# Patient Record
Sex: FEMALE | Race: BLACK OR AFRICAN AMERICAN | NOT HISPANIC OR LATINO | URBAN - METROPOLITAN AREA
[De-identification: names, ages, dates, MRNs, and addresses within clinical notes are randomized per-mention and may not be internally consistent; named-entity substitution may affect disease eponyms.]

---

## 2017-07-09 ENCOUNTER — INPATIENT (INPATIENT)
Facility: HOSPITAL | Age: 52
LOS: 3 days | Discharge: HOME | DRG: 392 | End: 2017-07-13
Attending: INTERNAL MEDICINE | Admitting: INTERNAL MEDICINE
Payer: COMMERCIAL

## 2017-07-09 VITALS
HEART RATE: 90 BPM | DIASTOLIC BLOOD PRESSURE: 74 MMHG | SYSTOLIC BLOOD PRESSURE: 122 MMHG | RESPIRATION RATE: 16 BRPM | TEMPERATURE: 98 F | OXYGEN SATURATION: 100 %

## 2017-07-09 DIAGNOSIS — R10.9 UNSPECIFIED ABDOMINAL PAIN: ICD-10-CM

## 2017-07-09 DIAGNOSIS — R11.10 VOMITING, UNSPECIFIED: ICD-10-CM

## 2017-07-09 DIAGNOSIS — R63.4 ABNORMAL WEIGHT LOSS: ICD-10-CM

## 2017-07-09 DIAGNOSIS — Z90.710 ACQUIRED ABSENCE OF BOTH CERVIX AND UTERUS: Chronic | ICD-10-CM

## 2017-07-09 LAB
ALBUMIN SERPL ELPH-MCNC: 3.8 G/DL — SIGNIFICANT CHANGE UP (ref 3.3–5)
ALP SERPL-CCNC: 33 U/L — LOW (ref 40–120)
ALT FLD-CCNC: 20 U/L RC — SIGNIFICANT CHANGE UP (ref 10–45)
ANION GAP SERPL CALC-SCNC: 15 MMOL/L — SIGNIFICANT CHANGE UP (ref 5–17)
APPEARANCE UR: CLEAR — SIGNIFICANT CHANGE UP
AST SERPL-CCNC: 37 U/L — SIGNIFICANT CHANGE UP (ref 10–40)
BASOPHILS # BLD AUTO: 0.1 K/UL — SIGNIFICANT CHANGE UP (ref 0–0.2)
BASOPHILS NFR BLD AUTO: 1.5 % — SIGNIFICANT CHANGE UP (ref 0–2)
BILIRUB SERPL-MCNC: 0.6 MG/DL — SIGNIFICANT CHANGE UP (ref 0.2–1.2)
BILIRUB UR-MCNC: NEGATIVE — SIGNIFICANT CHANGE UP
BUN SERPL-MCNC: 7 MG/DL — SIGNIFICANT CHANGE UP (ref 7–23)
CALCIUM SERPL-MCNC: 9.4 MG/DL — SIGNIFICANT CHANGE UP (ref 8.4–10.5)
CHLORIDE SERPL-SCNC: 102 MMOL/L — SIGNIFICANT CHANGE UP (ref 96–108)
CO2 SERPL-SCNC: 20 MMOL/L — LOW (ref 22–31)
COLOR SPEC: YELLOW — SIGNIFICANT CHANGE UP
CREAT SERPL-MCNC: 0.7 MG/DL — SIGNIFICANT CHANGE UP (ref 0.5–1.3)
DIFF PNL FLD: NEGATIVE — SIGNIFICANT CHANGE UP
EOSINOPHIL # BLD AUTO: 0 K/UL — SIGNIFICANT CHANGE UP (ref 0–0.5)
EOSINOPHIL NFR BLD AUTO: 0.4 % — SIGNIFICANT CHANGE UP (ref 0–6)
GAS PNL BLDV: SIGNIFICANT CHANGE UP
GLUCOSE SERPL-MCNC: 81 MG/DL — SIGNIFICANT CHANGE UP (ref 70–99)
GLUCOSE UR QL: NEGATIVE — SIGNIFICANT CHANGE UP
HCT VFR BLD CALC: 37.8 % — SIGNIFICANT CHANGE UP (ref 34.5–45)
HGB BLD-MCNC: 12.9 G/DL — SIGNIFICANT CHANGE UP (ref 11.5–15.5)
KETONES UR-MCNC: ABNORMAL
LEUKOCYTE ESTERASE UR-ACNC: NEGATIVE — SIGNIFICANT CHANGE UP
LYMPHOCYTES # BLD AUTO: 1.6 K/UL — SIGNIFICANT CHANGE UP (ref 1–3.3)
LYMPHOCYTES # BLD AUTO: 46.5 % — HIGH (ref 13–44)
MCHC RBC-ENTMCNC: 29 PG — SIGNIFICANT CHANGE UP (ref 27–34)
MCHC RBC-ENTMCNC: 34.1 GM/DL — SIGNIFICANT CHANGE UP (ref 32–36)
MCV RBC AUTO: 85 FL — SIGNIFICANT CHANGE UP (ref 80–100)
MONOCYTES # BLD AUTO: 0.5 K/UL — SIGNIFICANT CHANGE UP (ref 0–0.9)
MONOCYTES NFR BLD AUTO: 13.6 % — SIGNIFICANT CHANGE UP (ref 2–14)
NEUTROPHILS # BLD AUTO: 1.4 K/UL — LOW (ref 1.8–7.4)
NEUTROPHILS NFR BLD AUTO: 38 % — LOW (ref 43–77)
NITRITE UR-MCNC: NEGATIVE — SIGNIFICANT CHANGE UP
PH UR: 6.5 — SIGNIFICANT CHANGE UP (ref 5–8)
PLATELET # BLD AUTO: 253 K/UL — SIGNIFICANT CHANGE UP (ref 150–400)
POTASSIUM SERPL-MCNC: 4.7 MMOL/L — SIGNIFICANT CHANGE UP (ref 3.5–5.3)
POTASSIUM SERPL-SCNC: 4.7 MMOL/L — SIGNIFICANT CHANGE UP (ref 3.5–5.3)
PROT SERPL-MCNC: 7.9 G/DL — SIGNIFICANT CHANGE UP (ref 6–8.3)
PROT UR-MCNC: 30 MG/DL
RBC # BLD: 4.45 M/UL — SIGNIFICANT CHANGE UP (ref 3.8–5.2)
RBC # FLD: 12.4 % — SIGNIFICANT CHANGE UP (ref 10.3–14.5)
SODIUM SERPL-SCNC: 137 MMOL/L — SIGNIFICANT CHANGE UP (ref 135–145)
SP GR SPEC: 1.03 — HIGH (ref 1.01–1.02)
UROBILINOGEN FLD QL: NEGATIVE — SIGNIFICANT CHANGE UP
WBC # BLD: 3.6 K/UL — LOW (ref 3.8–10.5)
WBC # FLD AUTO: 3.6 K/UL — LOW (ref 3.8–10.5)

## 2017-07-09 PROCEDURE — 76705 ECHO EXAM OF ABDOMEN: CPT | Mod: 26

## 2017-07-09 PROCEDURE — 93010 ELECTROCARDIOGRAM REPORT: CPT

## 2017-07-09 PROCEDURE — 99285 EMERGENCY DEPT VISIT HI MDM: CPT | Mod: 25

## 2017-07-09 RX ORDER — ONDANSETRON 8 MG/1
4 TABLET, FILM COATED ORAL EVERY 8 HOURS
Qty: 0 | Refills: 0 | Status: COMPLETED | OUTPATIENT
Start: 2017-07-09 | End: 2017-07-09

## 2017-07-09 RX ORDER — FAMOTIDINE 10 MG/ML
20 INJECTION INTRAVENOUS ONCE
Qty: 0 | Refills: 0 | Status: COMPLETED | OUTPATIENT
Start: 2017-07-09 | End: 2017-07-09

## 2017-07-09 RX ORDER — PANTOPRAZOLE SODIUM 20 MG/1
40 TABLET, DELAYED RELEASE ORAL ONCE
Qty: 0 | Refills: 0 | Status: COMPLETED | OUTPATIENT
Start: 2017-07-09 | End: 2017-07-09

## 2017-07-09 RX ORDER — PANTOPRAZOLE SODIUM 20 MG/1
40 TABLET, DELAYED RELEASE ORAL
Qty: 0 | Refills: 0 | Status: DISCONTINUED | OUTPATIENT
Start: 2017-07-09 | End: 2017-07-13

## 2017-07-09 RX ORDER — SODIUM CHLORIDE 9 MG/ML
1000 INJECTION INTRAMUSCULAR; INTRAVENOUS; SUBCUTANEOUS ONCE
Qty: 0 | Refills: 0 | Status: COMPLETED | OUTPATIENT
Start: 2017-07-09 | End: 2017-07-09

## 2017-07-09 RX ORDER — SODIUM CHLORIDE 9 MG/ML
1000 INJECTION INTRAMUSCULAR; INTRAVENOUS; SUBCUTANEOUS
Qty: 0 | Refills: 0 | Status: DISCONTINUED | OUTPATIENT
Start: 2017-07-09 | End: 2017-07-13

## 2017-07-09 RX ORDER — METOCLOPRAMIDE HCL 10 MG
10 TABLET ORAL ONCE
Qty: 0 | Refills: 0 | Status: COMPLETED | OUTPATIENT
Start: 2017-07-09 | End: 2017-07-09

## 2017-07-09 RX ORDER — ENOXAPARIN SODIUM 100 MG/ML
40 INJECTION SUBCUTANEOUS DAILY
Qty: 0 | Refills: 0 | Status: DISCONTINUED | OUTPATIENT
Start: 2017-07-09 | End: 2017-07-13

## 2017-07-09 RX ORDER — SUCRALFATE 1 G
1 TABLET ORAL
Qty: 0 | Refills: 0 | Status: DISCONTINUED | OUTPATIENT
Start: 2017-07-09 | End: 2017-07-13

## 2017-07-09 RX ADMIN — SODIUM CHLORIDE 2000 MILLILITER(S): 9 INJECTION INTRAMUSCULAR; INTRAVENOUS; SUBCUTANEOUS at 17:35

## 2017-07-09 RX ADMIN — Medication 10 MILLIGRAM(S): at 17:35

## 2017-07-09 RX ADMIN — FAMOTIDINE 20 MILLIGRAM(S): 10 INJECTION INTRAVENOUS at 17:35

## 2017-07-09 RX ADMIN — SODIUM CHLORIDE 100 MILLILITER(S): 9 INJECTION INTRAMUSCULAR; INTRAVENOUS; SUBCUTANEOUS at 21:32

## 2017-07-09 RX ADMIN — ENOXAPARIN SODIUM 40 MILLIGRAM(S): 100 INJECTION SUBCUTANEOUS at 23:17

## 2017-07-09 RX ADMIN — PANTOPRAZOLE SODIUM 40 MILLIGRAM(S): 20 TABLET, DELAYED RELEASE ORAL at 19:17

## 2017-07-09 RX ADMIN — ONDANSETRON 4 MILLIGRAM(S): 8 TABLET, FILM COATED ORAL at 23:17

## 2017-07-09 NOTE — H&P ADULT - NEGATIVE CARDIOVASCULAR SYMPTOMS
no chest pain/no paroxysmal nocturnal dyspnea/no palpitations/no orthopnea/no dyspnea on exertion/no peripheral edema/no claudication

## 2017-07-09 NOTE — ED PROVIDER NOTE - PROGRESS NOTE DETAILS
Patient was evaluated by me, found in no acute distress, calm, speaking in complete sentences, with recent imaging studies remarkable for gastritis, but with history remarkable for poor tolerance to oral intake, and physical exam remarkable for epigastric tenderness to palpation. Labs ordered and medication ordered for treatment. Will monitor clinical progression. I agree with resident assessment and plan. Dr. Sherwin Davenport

## 2017-07-09 NOTE — H&P ADULT - RS GEN PE MLT RESP DETAILS PC
airway patent/clear to auscultation bilaterally/no rhonchi/good air movement/breath sounds equal/no rales/no intercostal retractions/respirations non-labored/no chest wall tenderness/normal

## 2017-07-09 NOTE — ED PROVIDER NOTE - MEDICAL DECISION MAKING DETAILS
52 yo F with intractable vomiting. Had recent CT with unremarkable finding. EGD showed acute gastritis. No sx improvement despite sucralfate, zofran, ranitidine. Will try reglan, CBC, BMP for electrolytes check. 50 yo F with intractable vomiting. Had recent CT with unremarkable finding. EGD showed acute gastritis. No sx improvement despite sucralfate, zofran, ranitidine. Will try reglan, CBC, BMP for electrolytes check.    Attending: Patient was evaluated by me, found in no acute distress, calm, speaking in complete sentences, with recent imaging studies remarkable for gastritis, but with history remarkable for poor tolerance to oral intake, and physical exam remarkable for epigastric tenderness to palpation. Labs ordered and medication ordered for treatment. Will monitor clinical progression. I agree with resident assessment and plan. Dr. Sherwin Davenport

## 2017-07-09 NOTE — H&P ADULT - HISTORY OF PRESENT ILLNESS
51 f with hx of gastritis pw 6 months hx  N/V. Pt has been vomiting 1-3 times most days for the past 6 months. She has seen GI in the past. Initially started on Nexium. Pt had EGD and bx done which showed acute gastritis. Pt was switched to sucralfate, Zofran, ranitidine, Pt also had CT a/p on 6/26/2017 which only shows liver cysts and R ovarian cyst. Pt reports that she lost about 40 lbs since then. Pt had abd hernia repair , hysterectomy and adhesiolysis in 2014. Pt has not been able to

## 2017-07-09 NOTE — H&P ADULT - GASTROINTESTINAL DETAILS
no distention/no bruit/bowel sounds normal/soft/no rebound tenderness/normal/no rigidity/no masses palpable/no guarding/nontender/no organomegaly

## 2017-07-09 NOTE — ED PROVIDER NOTE - ATTENDING CONTRIBUTION TO CARE
Patient was evaluated by me, found in no acute distress, calm, speaking in complete sentences, with recent imaging studies remarkable for gastritis, but with history remarkable for poor tolerance to oral intake, and physical exam remarkable for epigastric tenderness to palpation. Labs ordered and medication ordered for treatment. Patient remained symptomatic despite treatment at ED. Patient was admitted to Medicine due to intractable vomiting. I agree with resident assessment and plan. Dr. Sherwin Davenport

## 2017-07-09 NOTE — ED ADULT NURSE REASSESSMENT NOTE - NS ED NURSE REASSESS COMMENT FT1
Received report from PALOMA Brower. Pt A&Ox3, resting, VS stable. Safety checked. No c/o pain or discomfort at this time. Comfort care provided. Pt encouraged to call for assist when needed. pending dispo

## 2017-07-09 NOTE — ED PROVIDER NOTE - OBJECTIVE STATEMENT
51 f with 6 months hx N then n?V. seen GI. started on Nexium. switched to sucralfate, Zofran, ranitidine,  lost about 40 lbs since then. Pt had abd hernia repair , hysterectomy in 2014.    GI: Dr. Mcpherson 51 f with hx of gastritis pw 6 months hx  N/V. Pt has been vomiting 1-3 times most days for the past 6 months. She has seen GI in the past. Initially started on Nexium. Pt had EGD and bx done which showed acute gastritis. Pt was switched to sucralfate, Zofran, ranitidine, Pt also had CT a/p on 6/26/2017 which only shows liver cysts and R ovarian cyst. Pt reports that she lost about 40 lbs since then. Pt had abd hernia repair , hysterectomy and adhesiolysis in 2014. Pt has not been able to     GI: Dr. Mcpherson

## 2017-07-10 DIAGNOSIS — R11.2 NAUSEA WITH VOMITING, UNSPECIFIED: ICD-10-CM

## 2017-07-10 DIAGNOSIS — K29.50 UNSPECIFIED CHRONIC GASTRITIS WITHOUT BLEEDING: ICD-10-CM

## 2017-07-10 PROCEDURE — 71250 CT THORAX DX C-: CPT | Mod: 26

## 2017-07-10 RX ORDER — CHLORPROMAZINE HCL 10 MG
25 TABLET ORAL ONCE
Qty: 0 | Refills: 0 | Status: COMPLETED | OUTPATIENT
Start: 2017-07-10 | End: 2017-07-10

## 2017-07-10 RX ORDER — METOCLOPRAMIDE HCL 10 MG
5 TABLET ORAL
Qty: 0 | Refills: 0 | Status: COMPLETED | OUTPATIENT
Start: 2017-07-10 | End: 2017-07-10

## 2017-07-10 RX ORDER — ONDANSETRON 8 MG/1
4 TABLET, FILM COATED ORAL EVERY 6 HOURS
Qty: 0 | Refills: 0 | Status: COMPLETED | OUTPATIENT
Start: 2017-07-10 | End: 2017-07-10

## 2017-07-10 RX ADMIN — PANTOPRAZOLE SODIUM 40 MILLIGRAM(S): 20 TABLET, DELAYED RELEASE ORAL at 17:09

## 2017-07-10 RX ADMIN — Medication 25 MILLIGRAM(S): at 17:09

## 2017-07-10 RX ADMIN — ONDANSETRON 4 MILLIGRAM(S): 8 TABLET, FILM COATED ORAL at 08:04

## 2017-07-10 RX ADMIN — Medication 1 GRAM(S): at 11:42

## 2017-07-10 RX ADMIN — ENOXAPARIN SODIUM 40 MILLIGRAM(S): 100 INJECTION SUBCUTANEOUS at 11:41

## 2017-07-10 RX ADMIN — Medication 1 GRAM(S): at 17:09

## 2017-07-10 RX ADMIN — Medication 5 MILLIGRAM(S): at 11:42

## 2017-07-10 RX ADMIN — Medication 1 GRAM(S): at 23:03

## 2017-07-10 RX ADMIN — SODIUM CHLORIDE 100 MILLILITER(S): 9 INJECTION INTRAMUSCULAR; INTRAVENOUS; SUBCUTANEOUS at 23:03

## 2017-07-10 NOTE — CONSULT NOTE ADULT - PROBLEM SELECTOR RECOMMENDATION 2
- In the setting of normal imaging (US ABD and CT abd done as outpatient)  - In the setting of normal WBC/lfts (lipase pending)  - Continue Reglan 5 mg IV push x 1  - Will discuss case w/ attending - In the setting of normal imaging (US ABD and CT abd done as outpatient)  - In the setting of normal WBC/lfts (lipase pending)  - Continue Reglan 5 mg IV push x 1  - Plan for  upper gastrointestinal endoscopy in AM -- NPO p MN

## 2017-07-11 DIAGNOSIS — D72.819 DECREASED WHITE BLOOD CELL COUNT, UNSPECIFIED: ICD-10-CM

## 2017-07-11 DIAGNOSIS — R06.02 SHORTNESS OF BREATH: ICD-10-CM

## 2017-07-11 DIAGNOSIS — R91.1 SOLITARY PULMONARY NODULE: ICD-10-CM

## 2017-07-11 LAB
ALBUMIN SERPL ELPH-MCNC: 3.6 G/DL — SIGNIFICANT CHANGE UP (ref 3.3–5)
ALP SERPL-CCNC: 33 U/L — LOW (ref 40–120)
ALT FLD-CCNC: 20 U/L RC — SIGNIFICANT CHANGE UP (ref 10–45)
ANION GAP SERPL CALC-SCNC: 13 MMOL/L — SIGNIFICANT CHANGE UP (ref 5–17)
APTT BLD: 28.7 SEC — SIGNIFICANT CHANGE UP (ref 27.5–37.4)
AST SERPL-CCNC: 29 U/L — SIGNIFICANT CHANGE UP (ref 10–40)
BILIRUB SERPL-MCNC: 0.5 MG/DL — SIGNIFICANT CHANGE UP (ref 0.2–1.2)
BUN SERPL-MCNC: 6 MG/DL — LOW (ref 7–23)
CALCIUM SERPL-MCNC: 8.9 MG/DL — SIGNIFICANT CHANGE UP (ref 8.4–10.5)
CHLORIDE SERPL-SCNC: 105 MMOL/L — SIGNIFICANT CHANGE UP (ref 96–108)
CO2 SERPL-SCNC: 19 MMOL/L — LOW (ref 22–31)
CREAT SERPL-MCNC: 0.72 MG/DL — SIGNIFICANT CHANGE UP (ref 0.5–1.3)
GLUCOSE SERPL-MCNC: 73 MG/DL — SIGNIFICANT CHANGE UP (ref 70–99)
HCT VFR BLD CALC: 41 % — SIGNIFICANT CHANGE UP (ref 34.5–45)
HGB BLD-MCNC: 13.2 G/DL — SIGNIFICANT CHANGE UP (ref 11.5–15.5)
INR BLD: 1.1 RATIO — SIGNIFICANT CHANGE UP (ref 0.88–1.16)
MCHC RBC-ENTMCNC: 27.5 PG — SIGNIFICANT CHANGE UP (ref 27–34)
MCHC RBC-ENTMCNC: 32.1 GM/DL — SIGNIFICANT CHANGE UP (ref 32–36)
MCV RBC AUTO: 85.7 FL — SIGNIFICANT CHANGE UP (ref 80–100)
PLATELET # BLD AUTO: 238 K/UL — SIGNIFICANT CHANGE UP (ref 150–400)
POTASSIUM SERPL-MCNC: 4 MMOL/L — SIGNIFICANT CHANGE UP (ref 3.5–5.3)
POTASSIUM SERPL-SCNC: 4 MMOL/L — SIGNIFICANT CHANGE UP (ref 3.5–5.3)
PROT SERPL-MCNC: 7.5 G/DL — SIGNIFICANT CHANGE UP (ref 6–8.3)
PROTHROM AB SERPL-ACNC: 12 SEC — SIGNIFICANT CHANGE UP (ref 9.8–12.7)
RBC # BLD: 4.79 M/UL — SIGNIFICANT CHANGE UP (ref 3.8–5.2)
RBC # FLD: 12.5 % — SIGNIFICANT CHANGE UP (ref 10.3–14.5)
SODIUM SERPL-SCNC: 137 MMOL/L — SIGNIFICANT CHANGE UP (ref 135–145)
WBC # BLD: 3 K/UL — LOW (ref 3.8–10.5)
WBC # FLD AUTO: 3 K/UL — LOW (ref 3.8–10.5)

## 2017-07-11 PROCEDURE — 88342 IMHCHEM/IMCYTCHM 1ST ANTB: CPT | Mod: 26

## 2017-07-11 PROCEDURE — 88305 TISSUE EXAM BY PATHOLOGIST: CPT | Mod: 26

## 2017-07-11 PROCEDURE — 88312 SPECIAL STAINS GROUP 1: CPT | Mod: 26

## 2017-07-11 RX ORDER — CHLORPROMAZINE HCL 10 MG
25 TABLET ORAL ONCE
Qty: 0 | Refills: 0 | Status: COMPLETED | OUTPATIENT
Start: 2017-07-11 | End: 2017-07-11

## 2017-07-11 RX ADMIN — PANTOPRAZOLE SODIUM 40 MILLIGRAM(S): 20 TABLET, DELAYED RELEASE ORAL at 16:52

## 2017-07-11 RX ADMIN — Medication 25 MILLIGRAM(S): at 17:58

## 2017-07-11 RX ADMIN — Medication 1 GRAM(S): at 16:52

## 2017-07-11 RX ADMIN — SODIUM CHLORIDE 100 MILLILITER(S): 9 INJECTION INTRAMUSCULAR; INTRAVENOUS; SUBCUTANEOUS at 17:58

## 2017-07-11 RX ADMIN — ENOXAPARIN SODIUM 40 MILLIGRAM(S): 100 INJECTION SUBCUTANEOUS at 16:52

## 2017-07-11 RX ADMIN — Medication 1 GRAM(S): at 22:59

## 2017-07-11 NOTE — CONSULT NOTE ADULT - PROBLEM SELECTOR RECOMMENDATION 9
- 40 lb weight loss noted within the last year
Pt was incidentally found to have 3 mm RML nodule and has no pulmonary hx, never smoker and never had any malignancies, hence this nodule can be followed inone year as an outpatient.

## 2017-07-11 NOTE — CONSULT NOTE ADULT - PROBLEM SELECTOR RECOMMENDATION 2
Pt has this very vague history of SOB, which is not associated with any activity and can occur at any time: She denies wheezing and any other pulmonary history. There is no strong family history of asthma too. This symptom does not fit into any of the pulmonary pathology: Her VTE probability is pretty low: Her calf muscles are not swollen and tender. Will do spirometry in AM .

## 2017-07-11 NOTE — CONSULT NOTE ADULT - PROBLEM SELECTOR RECOMMENDATION 3
- Continue PPI 40 mg po bid  - Continue carafate 1 g po 4x /day
s/p endoscopy:  Normal esophagus.                       - Gastritis. Biopsied.                       - Duodenitis. Biopsied.  For gastric emptying study!!

## 2017-07-11 NOTE — CONSULT NOTE ADULT - PROBLEM SELECTOR PROBLEM 3
Intractable vomiting with nausea, unspecified vomiting type
Chronic gastritis without bleeding, unspecified gastritis type

## 2017-07-12 RX ORDER — CHLORPROMAZINE HCL 10 MG
25 TABLET ORAL ONCE
Qty: 0 | Refills: 0 | Status: COMPLETED | OUTPATIENT
Start: 2017-07-12 | End: 2017-07-12

## 2017-07-12 RX ORDER — CHLORPROMAZINE HCL 10 MG
25 TABLET ORAL ONCE
Qty: 0 | Refills: 0 | Status: DISCONTINUED | OUTPATIENT
Start: 2017-07-12 | End: 2017-07-12

## 2017-07-12 RX ADMIN — PANTOPRAZOLE SODIUM 40 MILLIGRAM(S): 20 TABLET, DELAYED RELEASE ORAL at 16:56

## 2017-07-12 RX ADMIN — Medication 1 GRAM(S): at 05:32

## 2017-07-12 RX ADMIN — Medication 25 MILLIGRAM(S): at 00:39

## 2017-07-12 RX ADMIN — PANTOPRAZOLE SODIUM 40 MILLIGRAM(S): 20 TABLET, DELAYED RELEASE ORAL at 05:32

## 2017-07-12 RX ADMIN — Medication 1 GRAM(S): at 11:40

## 2017-07-12 RX ADMIN — Medication 1 GRAM(S): at 16:56

## 2017-07-12 RX ADMIN — ENOXAPARIN SODIUM 40 MILLIGRAM(S): 100 INJECTION SUBCUTANEOUS at 16:56

## 2017-07-12 RX ADMIN — SODIUM CHLORIDE 100 MILLILITER(S): 9 INJECTION INTRAMUSCULAR; INTRAVENOUS; SUBCUTANEOUS at 16:56

## 2017-07-12 RX ADMIN — SODIUM CHLORIDE 100 MILLILITER(S): 9 INJECTION INTRAMUSCULAR; INTRAVENOUS; SUBCUTANEOUS at 05:32

## 2017-07-12 NOTE — CONSULT NOTE ADULT - SUBJECTIVE AND OBJECTIVE BOX
Chief Complaint:  Patient is a 51y old  Female who presents with a chief complaint of vomiting and abdominal pain (2017 19:45)    HPI:  51 f with hx of gastritis pw 6 months hx  N/V. Pt has been vomiting 1-3 times most days for the past 6 months. She has seen GI in the past. Initially started on Nexium. Pt had EGD and bx done which showed acute gastritis. Pt was switched to sucralfate, Zofran, ranitidine, Pt also had CT a/p on 2017 which only shows liver cysts and R ovarian cyst. Pt reports that she lost about 40 lbs since then. Pt had abd hernia repair , hysterectomy and adhesiolysis in . Pt has not been able to (2017 19:45)    Patient seen and examined at the bedside for c/o intermittent nausea (and nbnb emesis 1-3x per day) that has been persistent for the past 6 months. Nausea worse after meals, and associated with mild sensation of food bolus or liquid getting "stuck in the chest" as well as significant weight loss (? 40lbs approx) within the past year. Patient does endorse a mild decrease in her symptoms for a couple of weeks within this time frame but states that her symptoms have gotten much more frequent. She is unable to tolerate po at present.   Of note she did have an egd in late May of 2017 by Dr. Israel Mcpherson which revealed gastritis. She has been managed by her primary GI w/ protonix, carafate and zofran with minimal relief.      PAST MEDICAL & SURGICAL HISTORY:  Fibroid, uterine  H/O abdominal hysterectomy      Previous Diagnostic Testing:    EGD: 17 Dr. David Mcpherson --- gastritis     Colonoscopy: About 5 years ago -- negative     Home Medications:    sodium chloride 0.9%. 1000 milliLiter(s) IV Continuous <Continuous>  sucralfate 1 Gram(s) Oral four times a day  pantoprazole    Tablet 40 milliGRAM(s) Oral two times a day before meals  enoxaparin Injectable 40 milliGRAM(s) SubCutaneous daily  metoclopramide Injectable 5 milliGRAM(s) IV Push <User Schedule>        FAMILY HISTORY:  No pertinent family history in first degree relatives      Social History: Marital Status: Unknown Lives With: Alone Substance Abuse: None Smoker: None     Allergies    No Known Allergies    Intolerances: ----    Review of Systems:    General:  +wt loss, No fevers, chills, night sweats, +fatigue,   Eyes:  Good vision, no reported pain  ENT:  No sore throat, pain, runny nose, +dysphagia  CV:  No pain, palpitations hypo/hypertension  Resp:  No dyspnea, cough, tachypnea, wheezing  :  No pain, bleeding, incontinence, nocturia  Muscle:  No pain, + weakness  Neuro:  No weakness, tingling, memory problems  Psych:  No fatigue, insomnia, mood problems, depression  Endocrine:  No polyuria, polydypsia, cold/heat intolerance  Heme:  No petechiae, ecchymosis, easy bruisability  Skin:  No rash, tattoos, scars, edema    Physical Exam:    Vital Signs:  Vital Signs Last 24 Hrs  T(C): 36.7 (10 Jul 2017 09:27), Max: 37.2 (2017 21:12)  T(F): 98.1 (10 Jul 2017 09:), Max: 98.9 (2017 21:12)  HR: 73 (10 Jul 2017 09:) (67 - 90)  BP: 116/72 (10 Jul 2017 09:) (116/71 - 156/89)  BP(mean): --  RR: 17 (10 Jul 2017 09:) (16 - 18)  SpO2: 97% (10 Jul 2017 09:) (97% - 100%)  Daily Height in cm: 172.72 (10 Jul 2017 07:53)    Daily     General:  Appears stated age, well-groomed, well-nourished, no distress  HEENT:  NC/AT,  conjunctivae clear and pink, no thyromegaly, nodules, adenopathy, no JVD  Chest:  Full & symmetric excursion, no increased effort, breath sounds clear  Cardiovascular:  Regular rhythm, S1, S2, no murmur/rub/S3/S4, no abdominal bruit, no edema  Abdomen:  Soft, non-tender, non-distended, normoactive bowel sounds,  no masses ,no hepatosplenomeagaly, no signs of chronic liver disease, +hiccups  Extremities:  no cyanosis, clubbing or edema  Skin:  No rash/erythema/ecchymoses/petechiae/wounds/abscess/warm/dry  Neuro/Psych:  Alert, oriented, no asterixis, no tremor, no encephalopathy      Imaging:     < from: US Echo Abdomen Limited (ED) (17 @ 21:21) >  INTERPRETATION:  Grayscale imaging of the right upper quadrant was   performed.    The gallbladder was visualized and scanned in longitudinal and transverse   planes.  The anterior gallbladder wall measured  .28cm.  The common bile duct measured .21cm.  Gallstones not present.    Sonographic Nuno's sign not present.    IMPRESSION:Normal Gallbladder    < end of copied text >      Laboratory:      137  |  102  |  7   ----------------------------<  81  4.7   |  20<L>  |  0.70    Ca    9.4      2017 17:27    TPro  7.9  /  Alb  3.8  /  TBili  0.6  /  DBili  x   /  AST  37  /  ALT  20  /  AlkPhos  33<L>                            12.9   3.6   )-----------( 253      ( 2017 17:27 )             37.8         LIVER FUNCTIONS - ( 2017 17:27 )  Alb: 3.8 g/dL / Pro: 7.9 g/dL / ALK PHOS: 33 U/L / ALT: 20 U/L RC / AST: 37 U/L / GGT: x             Urinalysis Basic - ( 2017 19:43 )    Color: Yellow / Appearance: Clear / S.028 / pH: x  Gluc: x / Ketone: Large  / Bili: Negative / Urobili: Negative   Blood: x / Protein: 30 mg/dL / Nitrite: Negative   Leuk Esterase: Negative / RBC: 0-2 /HPF / WBC 2-5 /HPF   Sq Epi: x / Non Sq Epi: x / Bacteria: Few /HPF
I have reviewed the history and discussed with the patient. In addition, pt says since may she feels SOB of and on and she can feel it any time: She denies any history of malignancy and no history of any pulmonary issues. She can feel SOB off and on at any time, not particularly related to any activity and never woke up from sleep with SOB. She never has PE/ DVT and doesn't use inhalers!!      Patient is a 51y old  Female who presents with a chief complaint of vomiting and abdominal pain (2017 19:45)      HPI:  51 f with hx of gastritis pw 6 months hx  N/V. Pt has been vomiting 1-3 times most days for the past 6 months. She has seen GI in the past. Initially started on Nexium. Pt had EGD and bx done which showed acute gastritis. Pt was switched to sucralfate, Zofran, ranitidine, Pt also had CT a/p on 2017 which only shows liver cysts and R ovarian cyst. Pt reports that she lost about 40 lbs since then. Pt had abd hernia repair , hysterectomy and adhesiolysis in . Pt has not been able to (2017 19:45)      ?FOLLOWING PRESENT  [x ] Hx of PE/DVT, [x ] Hx COPD, [x ] Hx of Asthma, [x ] Hx of Hospitalization, [ x]  Hx of BiPAP/CPAP use, [x ] Hx of DIETER    Allergies    No Known Allergies    Intolerances        PAST MEDICAL & SURGICAL HISTORY:  Fibroid, uterine  H/O abdominal hysterectomy      FAMILY HISTORY:  No pertinent family history in first degree relatives      Social History: [x  ] TOBACCO                  [x  ] ETOH                                 [ x ] IVDA/DRUGS    REVIEW OF SYSTEMS      General:	weight loss 40 lbs    Skin/Breast:x  	  Ophthalmologic:x  	  ENMT:	    Respiratory and Thorax: sob off and on  	  Cardiovascular:	x    Gastrointestinal:	 nausea and vomiting     Genitourinary:	x    Musculoskeletal:	x    Neurological:x	    Psychiatric:	x    Hematology/Lymphatics:x	    Endocrine:	x    Allergic/Immunologic:	x    MEDICATIONS  (STANDING):  sodium chloride 0.9%. 1000 milliLiter(s) (100 mL/Hr) IV Continuous <Continuous>  sucralfate 1 Gram(s) Oral four times a day  pantoprazole    Tablet 40 milliGRAM(s) Oral two times a day before meals  enoxaparin Injectable 40 milliGRAM(s) SubCutaneous daily  chlorproMAZINE    Tablet 25 milliGRAM(s) Oral once    MEDICATIONS  (PRN):       Vital Signs Last 24 Hrs  T(C): 36.9 (2017 15:59), Max: 37.1 (2017 05:39)  T(F): 98.5 (2017 15:59), Max: 98.8 (2017 05:39)  HR: 68 (2017 15:59) (68 - 87)  BP: 136/78 (2017 15:59) (127/87 - 144/75)  BP(mean): --  RR: 18 (2017 15:59) (16 - 18)  SpO2: 100% (2017 13:10) (95% - 100%)        I&O's Summary    10 Jul 2017 07:  -  2017 07:00  --------------------------------------------------------  IN: 2290 mL / OUT: 1 mL / NET: 2289 mL    2017 07:01  -  2017 17:24  --------------------------------------------------------  IN: 400 mL / OUT: 0 mL / NET: 400 mL        Physical Exam:   GENERAL: NAD, well-groomed, well-developed  HEENT: SYLVIA/   Atraumatic, Normocephalic  ENMT: No tonsillar erythema, exudates, or enlargement; Moist mucous membranes, Good dentition, No lesions  NECK: Supple, No JVD, Normal thyroid  CHEST/LUNG: Clear to percussion bilaterally; No rales, rhonchi, wheezing, or rubs  CVS: Regular rate and rhythm; No murmurs, rubs, or gallops  GI: : Soft, Nontender, Nondistended; Bowel sounds present  NERVOUS SYSTEM:  Alert & Oriented X3  EXTREMITIES:  2+ Peripheral Pulses, No clubbing, cyanosis, or edema  LYMPH: No lymphadenopathy noted  SKIN: No rashes or lesions  ENDOCRINOLOGY: No Thyromegaly  PSYCH: Appropriate    Labs:                        13.2   3.0   )-----------( 238      ( 2017 06:53 )             41.0                         12.9   3.6   )-----------( 253      ( 2017 17:27 )             37.8         137  |  105  |  6<L>  ----------------------------<  73  4.0   |  19<L>  |  0.72      137  |  102  |  7   ----------------------------<  81  4.7   |  20<L>  |  0.70    Ca    8.9      2017 06:50  Ca    9.4      2017 17:27    TPro  7.5  /  Alb  3.6  /  TBili  0.5  /  DBili  x   /  AST  29  /  ALT  20  /  AlkPhos  33<L>    TPro  7.9  /  Alb  3.8  /  TBili  0.6  /  DBili  x   /  AST  37  /  ALT  20  /  AlkPhos  33<L>      CAPILLARY BLOOD GLUCOSE        LIVER FUNCTIONS - ( 2017 06:50 )  Alb: 3.6 g/dL / Pro: 7.5 g/dL / ALK PHOS: 33 U/L / ALT: 20 U/L RC / AST: 29 U/L / GGT: x           PT/INR - ( 2017 06:53 )   PT: 12.0 sec;   INR: 1.10 ratio         PTT - ( 2017 06:53 )  PTT:28.7 sec  Urinalysis Basic - ( 2017 19:43 )    Color: Yellow / Appearance: Clear / S.028 / pH: x  Gluc: x / Ketone: Large  / Bili: Negative / Urobili: Negative   Blood: x / Protein: 30 mg/dL / Nitrite: Negative   Leuk Esterase: Negative / RBC: 0-2 /HPF / WBC 2-5 /HPF   Sq Epi: x / Non Sq Epi: x / Bacteria: Few /HPF      D DImer    Cultures:   Studies  Chest X-RAY  CT SCAN Chest < from: CT Chest No Cont (07.10.17 @ 16:35) >    IMPRESSION:   0.3 cm solid nodule in the right middle lobe is indeterminate based on   this exam. According to the current guidelines, no further follow-up is   recommended in the absence of known history of malignancy and/or risk   factors, otherwise, follow-up CT scan is recommended in one year. If the   patient has history of malignancy, follow-up examination as indicated by   the guidelines for that malignancy is recommended.      DAVION FONTENOT M.D., RADIOLOGY RESIDENT  This document has been electronically signed.  LAKE MITCHELL M.D., ATTENDING RADIOLOGIST  This document has been electronically signed. Jul 10 2017  4:58PM    < end of copied text >    CT Abdomen  Venous Dopplers: LE:   Others
Patient is a 51y old  Female who presents with a chief complaint of vomiting and abdominal pain (09 Jul 2017 19:45), here with abdominal pain, weight loss of 40 pounds in last few months, has been on motrin for headaches and had CT abd/pelvis done as outpt, was supposed to see gyn on monday.  Has no h/o any bleeding, back pain, any frequent or serious infections or any frequent use of abx.  No h/o blood transfusion, hepatitis or jaundice.  Last colonoscopy was 4 years ago, last mammo was last year.      PAST MEDICAL & SURGICAL HISTORY:  Fibroid, uterine  H/O abdominal hysterectomy    Meds:  sodium chloride 0.9%. 1000 milliLiter(s) IV Continuous <Continuous>  sucralfate 1 Gram(s) Oral four times a day  pantoprazole    Tablet 40 milliGRAM(s) Oral two times a day before meals  enoxaparin Injectable 40 milliGRAM(s) SubCutaneous daily      No Known Allergies    SHx: Quit alcohol in december, used to drink socially, smoked a couple of times when she was 11.    FAMILY HISTORY:  No pertinent family history in first degree relatives      Vital Signs Last 24 Hrs  T(C): 37.1 (12 Jul 2017 05:12), Max: 37.1 (11 Jul 2017 20:50)  T(F): 98.8 (12 Jul 2017 05:12), Max: 98.8 (12 Jul 2017 05:12)  HR: 85 (12 Jul 2017 05:12) (68 - 85)  BP: 150/87 (12 Jul 2017 05:12) (127/87 - 158/80)  BP(mean): --  RR: 16 (12 Jul 2017 05:12) (16 - 18)  SpO2: 100% (12 Jul 2017 05:12) (95% - 100%)                          13.2   3.0   )-----------( 238      ( 11 Jul 2017 06:53 )             41.0       07-11    137  |  105  |  6<L>  ----------------------------<  73  4.0   |  19<L>  |  0.72    Ca    8.9      11 Jul 2017 06:50    TPro  7.5  /  Alb  3.6  /  TBili  0.5  /  DBili  x   /  AST  29  /  ALT  20  /  AlkPhos  33<L>  07-11      PT/INR - ( 11 Jul 2017 06:53 )   PT: 12.0 sec;   INR: 1.10 ratio         PTT - ( 11 Jul 2017 06:53 )  PTT:28.7 sec      CT abd/pelvis from 6.23.17 (done at [facility]): scattered hepatic cysts upto 1.1 cm, s/p hysterectomy, several right ovarian cysts upto 1.6 cm    CT chest: IMPRESSION:   0.3 cm solid nodule in the right middle lobe is indeterminate based on   this exam. According to the current guidelines, no further follow-up is   recommended in the absence of known history of malignancy and/or risk   factors, otherwise, follow-up CT scan is recommended in one year. If the   patient has history of malignancy, follow-up examination as indicated by   the guidelines for that malignancy is recommended.    Limited abd u/s: IMPRESSION: Normal Gallbladder    EGD: Findings:       The examined esophagus was normal.       Mild inflammation was found in the gastric antrum. Biopsies were taken with a cold forceps        for histology. Estimated blood loss: none.       Localized mild inflammation was found in the duodenal bulb. Biopsies were taken with a cold        forceps for histology. Estimated blood loss: none.                                                                                                        Impression:          - Normal esophagus.                       - Gastritis. Biopsied.                       - Duodenitis. Biopsied.  Recommendation:      - [facility].                       - Resume previous diet.                       - Do a gastric emptying study.              - DC reglan

## 2017-07-12 NOTE — CONSULT NOTE ADULT - ASSESSMENT
51 F w/ pmhx gastritis admitted for intermittent nausea and vomiting x 6 months associated with 40 lb weight loss and intermittent dysphagia to solids/liquids
51 f with hx of gastritis pw 6 months hx  N/V. Pt has been vomiting 1-3 times most days for the past 6 months. She has seen GI in the past.  Pt reports that she lost about 40 lbs since then. On workup in [facility] she had ct chest which showed 3 mm RML nodule and since pt is also complaining of SOB off and on, pulmonary called to evaluate
51F with weight loss, mild leucopenia and above findings on scans and other work up, will recommend:  - continue Rx as per medicine and GI.  - f/u on Bx results  - obtain RF, KELTON, B12, folate, hepatitis profile and HIV 1/2  - , CEA, CA 19-9 and CA 27.29  - monitor CBC  - CTS evaluation  - Gyn/onc evaluation  - will f/u

## 2017-07-13 VITALS
TEMPERATURE: 98 F | HEART RATE: 82 BPM | RESPIRATION RATE: 16 BRPM | DIASTOLIC BLOOD PRESSURE: 84 MMHG | SYSTOLIC BLOOD PRESSURE: 123 MMHG | OXYGEN SATURATION: 97 %

## 2017-07-13 DIAGNOSIS — K31.84 GASTROPARESIS: ICD-10-CM

## 2017-07-13 DIAGNOSIS — K29.90 GASTRODUODENITIS, UNSPECIFIED, WITHOUT BLEEDING: ICD-10-CM

## 2017-07-13 LAB
ANION GAP SERPL CALC-SCNC: 13 MMOL/L — SIGNIFICANT CHANGE UP (ref 5–17)
BUN SERPL-MCNC: 5 MG/DL — LOW (ref 7–23)
CALCIUM SERPL-MCNC: 8.8 MG/DL — SIGNIFICANT CHANGE UP (ref 8.4–10.5)
CANCER AG125 SERPL-ACNC: 12 U/ML — SIGNIFICANT CHANGE UP
CANCER AG19-9 SERPL-ACNC: 11.4 U/ML — SIGNIFICANT CHANGE UP
CANCER AG27-29 SERPL-ACNC: 15.2 U/ML — SIGNIFICANT CHANGE UP (ref 0–37.7)
CEA SERPL-MCNC: 0.5 NG/ML — SIGNIFICANT CHANGE UP (ref 0–3.8)
CHLORIDE SERPL-SCNC: 104 MMOL/L — SIGNIFICANT CHANGE UP (ref 96–108)
CO2 SERPL-SCNC: 19 MMOL/L — LOW (ref 22–31)
CREAT SERPL-MCNC: 0.64 MG/DL — SIGNIFICANT CHANGE UP (ref 0.5–1.3)
FOLATE SERPL-MCNC: >20 NG/ML — SIGNIFICANT CHANGE UP (ref 4.8–24.2)
GLUCOSE SERPL-MCNC: 83 MG/DL — SIGNIFICANT CHANGE UP (ref 70–99)
HAV IGM SER-ACNC: SIGNIFICANT CHANGE UP
HBV CORE IGM SER-ACNC: SIGNIFICANT CHANGE UP
HBV SURFACE AG SER-ACNC: SIGNIFICANT CHANGE UP
HCT VFR BLD CALC: 37.4 % — SIGNIFICANT CHANGE UP (ref 34.5–45)
HCV AB S/CO SERPL IA: 0.09 S/CO — SIGNIFICANT CHANGE UP
HCV AB SERPL-IMP: SIGNIFICANT CHANGE UP
HGB BLD-MCNC: 12.7 G/DL — SIGNIFICANT CHANGE UP (ref 11.5–15.5)
HIV 1+2 AB+HIV1 P24 AG SERPL QL IA: SIGNIFICANT CHANGE UP
MCHC RBC-ENTMCNC: 28.9 PG — SIGNIFICANT CHANGE UP (ref 27–34)
MCHC RBC-ENTMCNC: 33.9 GM/DL — SIGNIFICANT CHANGE UP (ref 32–36)
MCV RBC AUTO: 85.5 FL — SIGNIFICANT CHANGE UP (ref 80–100)
PLATELET # BLD AUTO: 236 K/UL — SIGNIFICANT CHANGE UP (ref 150–400)
POTASSIUM SERPL-MCNC: 3.6 MMOL/L — SIGNIFICANT CHANGE UP (ref 3.5–5.3)
POTASSIUM SERPL-SCNC: 3.6 MMOL/L — SIGNIFICANT CHANGE UP (ref 3.5–5.3)
RBC # BLD: 4.37 M/UL — SIGNIFICANT CHANGE UP (ref 3.8–5.2)
RBC # FLD: 12.5 % — SIGNIFICANT CHANGE UP (ref 10.3–14.5)
RHEUMATOID FACT SERPL-ACNC: <7 IU/ML — SIGNIFICANT CHANGE UP (ref 0–13.9)
SODIUM SERPL-SCNC: 136 MMOL/L — SIGNIFICANT CHANGE UP (ref 135–145)
VIT B12 SERPL-MCNC: 722 PG/ML — SIGNIFICANT CHANGE UP (ref 243–894)
WBC # BLD: 3.1 K/UL — LOW (ref 3.8–10.5)
WBC # FLD AUTO: 3.1 K/UL — LOW (ref 3.8–10.5)

## 2017-07-13 PROCEDURE — 83605 ASSAY OF LACTIC ACID: CPT

## 2017-07-13 PROCEDURE — 88312 SPECIAL STAINS GROUP 1: CPT

## 2017-07-13 PROCEDURE — 85730 THROMBOPLASTIN TIME PARTIAL: CPT

## 2017-07-13 PROCEDURE — 88305 TISSUE EXAM BY PATHOLOGIST: CPT

## 2017-07-13 PROCEDURE — 80074 ACUTE HEPATITIS PANEL: CPT

## 2017-07-13 PROCEDURE — 93005 ELECTROCARDIOGRAM TRACING: CPT

## 2017-07-13 PROCEDURE — 94010 BREATHING CAPACITY TEST: CPT

## 2017-07-13 PROCEDURE — 86431 RHEUMATOID FACTOR QUANT: CPT

## 2017-07-13 PROCEDURE — 82607 VITAMIN B-12: CPT

## 2017-07-13 PROCEDURE — 82803 BLOOD GASES ANY COMBINATION: CPT

## 2017-07-13 PROCEDURE — 84295 ASSAY OF SERUM SODIUM: CPT

## 2017-07-13 PROCEDURE — 82378 CARCINOEMBRYONIC ANTIGEN: CPT

## 2017-07-13 PROCEDURE — 86304 IMMUNOASSAY TUMOR CA 125: CPT

## 2017-07-13 PROCEDURE — 78264 GASTRIC EMPTYING IMG STUDY: CPT | Mod: 26

## 2017-07-13 PROCEDURE — A9548: CPT

## 2017-07-13 PROCEDURE — 84132 ASSAY OF SERUM POTASSIUM: CPT

## 2017-07-13 PROCEDURE — 96375 TX/PRO/DX INJ NEW DRUG ADDON: CPT

## 2017-07-13 PROCEDURE — 85014 HEMATOCRIT: CPT

## 2017-07-13 PROCEDURE — 86300 IMMUNOASSAY TUMOR CA 15-3: CPT

## 2017-07-13 PROCEDURE — 82330 ASSAY OF CALCIUM: CPT

## 2017-07-13 PROCEDURE — 82746 ASSAY OF FOLIC ACID SERUM: CPT

## 2017-07-13 PROCEDURE — 88341 IMHCHEM/IMCYTCHM EA ADD ANTB: CPT

## 2017-07-13 PROCEDURE — 82435 ASSAY OF BLOOD CHLORIDE: CPT

## 2017-07-13 PROCEDURE — 76705 ECHO EXAM OF ABDOMEN: CPT

## 2017-07-13 PROCEDURE — 82947 ASSAY GLUCOSE BLOOD QUANT: CPT

## 2017-07-13 PROCEDURE — 99285 EMERGENCY DEPT VISIT HI MDM: CPT | Mod: 25

## 2017-07-13 PROCEDURE — 87389 HIV-1 AG W/HIV-1&-2 AB AG IA: CPT

## 2017-07-13 PROCEDURE — 80048 BASIC METABOLIC PNL TOTAL CA: CPT

## 2017-07-13 PROCEDURE — 71250 CT THORAX DX C-: CPT

## 2017-07-13 PROCEDURE — 78264 GASTRIC EMPTYING IMG STUDY: CPT

## 2017-07-13 PROCEDURE — 81001 URINALYSIS AUTO W/SCOPE: CPT

## 2017-07-13 PROCEDURE — 88342 IMHCHEM/IMCYTCHM 1ST ANTB: CPT

## 2017-07-13 PROCEDURE — 80053 COMPREHEN METABOLIC PANEL: CPT

## 2017-07-13 PROCEDURE — A9541: CPT

## 2017-07-13 PROCEDURE — 85610 PROTHROMBIN TIME: CPT

## 2017-07-13 PROCEDURE — 85027 COMPLETE CBC AUTOMATED: CPT

## 2017-07-13 PROCEDURE — 86038 ANTINUCLEAR ANTIBODIES: CPT

## 2017-07-13 PROCEDURE — 96374 THER/PROPH/DIAG INJ IV PUSH: CPT

## 2017-07-13 PROCEDURE — 86301 IMMUNOASSAY TUMOR CA 19-9: CPT

## 2017-07-13 RX ORDER — METOCLOPRAMIDE HCL 10 MG
1 TABLET ORAL
Qty: 42 | Refills: 0 | OUTPATIENT
Start: 2017-07-13 | End: 2017-07-27

## 2017-07-13 RX ORDER — ONDANSETRON 8 MG/1
0 TABLET, FILM COATED ORAL
Qty: 0 | Refills: 0 | COMMUNITY

## 2017-07-13 RX ORDER — SUCRALFATE 1 G
0 TABLET ORAL
Qty: 0 | Refills: 0 | COMMUNITY

## 2017-07-13 RX ORDER — PANTOPRAZOLE SODIUM 20 MG/1
1 TABLET, DELAYED RELEASE ORAL
Qty: 28 | Refills: 0 | OUTPATIENT
Start: 2017-07-13 | End: 2017-07-27

## 2017-07-13 RX ORDER — METOCLOPRAMIDE HCL 10 MG
1 TABLET ORAL
Qty: 56 | Refills: 0 | OUTPATIENT
Start: 2017-07-13 | End: 2017-07-27

## 2017-07-13 RX ORDER — SUCRALFATE 1 G
1 TABLET ORAL
Qty: 0 | Refills: 0 | COMMUNITY
Start: 2017-07-13

## 2017-07-13 RX ORDER — RANITIDINE HYDROCHLORIDE 150 MG/1
0 TABLET, FILM COATED ORAL
Qty: 0 | Refills: 0 | COMMUNITY

## 2017-07-13 RX ADMIN — PANTOPRAZOLE SODIUM 40 MILLIGRAM(S): 20 TABLET, DELAYED RELEASE ORAL at 05:17

## 2017-07-13 RX ADMIN — Medication 1 GRAM(S): at 05:18

## 2017-07-13 RX ADMIN — PANTOPRAZOLE SODIUM 40 MILLIGRAM(S): 20 TABLET, DELAYED RELEASE ORAL at 16:47

## 2017-07-13 RX ADMIN — Medication 1 GRAM(S): at 16:47

## 2017-07-13 NOTE — PROGRESS NOTE ADULT - PROBLEM SELECTOR PROBLEM 1
Weight loss
Gastroparesis
Weight loss
Abdominal pain
Intractable vomiting with nausea, unspecified vomiting type
Pulmonary nodule, right
Pulmonary nodule, right

## 2017-07-13 NOTE — PROGRESS NOTE ADULT - PROBLEM SELECTOR PROBLEM 3
Chronic gastritis without bleeding, unspecified gastritis type
Intractable vomiting with nausea, unspecified vomiting type
Chronic gastritis without bleeding, unspecified gastritis type
Leucopenia
Pulmonary nodule, right
Weight loss
Weight loss

## 2017-07-13 NOTE — PROGRESS NOTE ADULT - PROVIDER SPECIALTY LIST ADULT
Gastroenterology
Internal Medicine
Pulmonology
Gastroenterology

## 2017-07-13 NOTE — PROGRESS NOTE ADULT - PROBLEM SELECTOR PLAN 2
- S/P EGD w/ gastritis and duodenitis noted (biopsied)  - For GES today - f/u results
Exact cause not know. Feels fine now.
S/P CT abdomen,EGD and awaitng gastric emptying.
Will change to Reglan
sec to gastroparesis so Regaln.
- S/P EGD yesterday w/ gastritis and duodenitis noted (biopsied)  - For GES tomorrow morning at 8 AM- must be NPO p MN  - Reglan d/c
resolved: she has not been wheezing:

## 2017-07-13 NOTE — DISCHARGE NOTE ADULT - PATIENT PORTAL LINK FT
“You can access the [facility] Patient Portal, offered by [facility], by registering with the following website: [email or web address]”

## 2017-07-13 NOTE — DISCHARGE NOTE ADULT - MEDICATION SUMMARY - MEDICATIONS TO TAKE
I will START or STAY ON the medications listed below when I get home from the hospital:    metoclopramide 5 mg oral tablet  -- 1 tab(s) by mouth 3 times a day  -- May cause drowsiness.  Alcohol may intensify this effect.  Use care when operating dangerous machinery.  Take medication on an empty stomach 1 hour before or 2 to 3 hours after a meal unless otherwise directed by your doctor.    -- Indication: For Failed gastric emptying study    sucralfate 1 g oral tablet  -- 1 tab(s) by mouth 4 times a day  -- Indication: For Gerd    pantoprazole 40 mg oral delayed release tablet  -- 1 tab(s) by mouth 2 times a day (before meals)  -- Indication: For Gerd I will START or STAY ON the medications listed below when I get home from the hospital:    metoclopramide 5 mg oral tablet  -- 1 tab(s) by mouth every 6 hours  -- May cause drowsiness.  Alcohol may intensify this effect.  Use care when operating dangerous machinery.  Take medication on an empty stomach 1 hour before or 2 to 3 hours after a meal unless otherwise directed by your doctor.    -- Indication: For Gastroparesis    sucralfate 1 g oral tablet  -- 1 tab(s) by mouth 4 times a day  -- Indication: For Gerd    pantoprazole 40 mg oral delayed release tablet  -- 1 tab(s) by mouth 2 times a day (before meals)  -- Indication: For Gerd

## 2017-07-13 NOTE — DISCHARGE NOTE ADULT - CARE PLAN
Principal Discharge DX:	Abdominal pain  Goal:	improved  Instructions for follow-up, activity and diet:	please  Secondary Diagnosis:	Chronic gastritis without bleeding, unspecified gastritis type Principal Discharge DX:	Abdominal pain  Goal:	improved  Instructions for follow-up, activity and diet:	You had a gastric emptying study test revealed gastropareisis, continue reglan for 2 weeks and follow up with Gastroenterologist  Secondary Diagnosis:	Chronic gastritis without bleeding, unspecified gastritis type  Instructions for follow-up, activity and diet:	continue Carafate and protoxin Principal Discharge DX:	Abdominal pain  Goal:	improved  Instructions for follow-up, activity and diet:	You had a gastric emptying study test revealed gastroparesis, continue Reglan for 2 weeks and follow up with Gastroenterologist  Secondary Diagnosis:	Chronic gastritis without bleeding, unspecified gastritis type  Instructions for follow-up, activity and diet:	continue Carafate and protoxin  Secondary Diagnosis:	Leukopenia, unspecified type  Instructions for follow-up, activity and diet:	please follow up with your primary attending to evaluate low white blood cells.

## 2017-07-13 NOTE — PROGRESS NOTE ADULT - SUBJECTIVE AND OBJECTIVE BOX
INTERVAL HPI/OVERNIGHT EVENTS:  Pt S/E --   Without abdominal pain, + mild nausea   Currently NPO  No GIB    MEDICATIONS  (STANDING):  sodium chloride 0.9%. 1000 milliLiter(s) (100 mL/Hr) IV Continuous <Continuous>  sucralfate 1 Gram(s) Oral four times a day  pantoprazole    Tablet 40 milliGRAM(s) Oral two times a day before meals  enoxaparin Injectable 40 milliGRAM(s) SubCutaneous daily    MEDICATIONS  (PRN):  ---    Allergies    No Known Allergies    Intolerances: ----      ROS:   General:  No wt loss, fevers, chills, night sweats,+ fatigue,   Eyes:  Good vision, no reported pain  ENT:  No sore throat, pain, runny nose, +dysphagia  CV:  No pain, palpitations, hypo/hypertension  Resp:  No dyspnea, cough, tachypnea, wheezing  GI:  No pain, +nausea, no vomiting, No diarrhea, No constipation, No weight loss, No fever, No pruritis, No rectal bleeding, No tarry stools, No dysphagia,  :  No pain, bleeding, incontinence, nocturia  Muscle:  No pain,+ weakness  Neuro:  No weakness, tingling, memory problems  Psych:  No fatigue, insomnia, mood problems, depression  Endocrine:  No polyuria, polydipsia, cold/heat intolerance  Heme:  No petechiae, ecchymosis, easy bruisability  Skin:  No rash, tattoos, scars, edema      PHYSICAL EXAM:   Vital Signs:   Vital Signs Last 24 Hrs  T(C): 37.1 (13 Jul 2017 09:00), Max: 37.3 (12 Jul 2017 13:21)  T(F): 98.7 (13 Jul 2017 09:00), Max: 99.2 (12 Jul 2017 13:21)  HR: 86 (13 Jul 2017 09:00) (75 - 91)  BP: 125/76 (13 Jul 2017 09:00) (125/76 - 155/81)  BP(mean): --  RR: 16 (13 Jul 2017 09:00) (16 - 18)  SpO2: 98% (13 Jul 2017 09:00) (98% - 100%)  Daily     Daily     GENERAL:  OOB, in nad, seen prior to leaving for [facility]  HEENT:  NC/AT,  conjunctivae clear and pink, no thyromegaly, nodules, adenopathy, no JVD, sclera -anicteric  CHEST:  Full & symmetric excursion, no increased effort, breath sounds clear  HEART:  Regular rhythm, S1, S2, no murmur/rub/S3/S4, no abdominal bruit, no edema  ABDOMEN:  Soft, non-tender, non-distended, normoactive bowel sounds,  no masses ,no hepato-splenomegaly, no signs of chronic liver disease  EXTEREMITIES:  no cyanosis, clubbing or edema  SKIN:  No rash/erythema/ecchymoses/petechiae/wounds/abscess/warm/dry  NEURO:  Alert, oriented, no asterixis, no tremor, no encephalopathy    LABS:                        12.7   3.1   )-----------( 236      ( 13 Jul 2017 06:31 )             37.4     07-13    136  |  104  |  5<L>  ----------------------------<  83  3.6   |  19<L>  |  0.64    Ca    8.8      13 Jul 2017 06:31        RADIOLOGY & ADDITIONAL TESTS:    < from: Upper Endoscopy (07.11.17 @ 13:52) >  Findings:       The examined esophagus was normal.       Mild inflammation was found in the gastric antrum. Biopsies were taken with a cold forceps        for histology. Estimated blood loss: none.       Localized mild inflammation was found in the duodenal bulb. Biopsies were taken with a cold        forceps for histology. Estimated blood loss: none.                                                                                                        Impression:          - Normal esophagus.                       - Gastritis. Biopsied.                       - Duodenitis. Biopsied.  Recommendation:      - [facility].                       - Resume previous diet.                       - Do a gastric emptying study.              - DC reglan    < end of copied text >
INTERVAL HPI/OVERNIGHT EVENTS: Still feel weak and nauseated.   Vital Signs Last 24 Hrs  T(C): 36.9 (2017 13:10), Max: 37.1 (10 Jul 2017 17:23)  T(F): 98.4 (2017 13:10), Max: 98.8 (10 Jul 2017 17:23)  HR: 83 (2017 13:10) (70 - 87)  BP: 127/87 (2017 13:10) (124/80 - 144/75)  BP(mean): --  RR: 18 (2017 13:10) (16 - 18)  SpO2: 100% (2017 13:10) (95% - 100%)  I&O's Summary    10 Jul 2017 07:  -  2017 07:00  --------------------------------------------------------  IN: 2290 mL / OUT: 1 mL / NET: 2289 mL    2017 07:01  -  2017 15:47  --------------------------------------------------------  IN: 400 mL / OUT: 0 mL / NET: 400 mL      MEDICATIONS  (STANDING):  sodium chloride 0.9%. 1000 milliLiter(s) (100 mL/Hr) IV Continuous <Continuous>  sucralfate 1 Gram(s) Oral four times a day  pantoprazole    Tablet 40 milliGRAM(s) Oral two times a day before meals  enoxaparin Injectable 40 milliGRAM(s) SubCutaneous daily    MEDICATIONS  (PRN):    LABS:                        13.2   3.0   )-----------( 238      ( 2017 06:53 )             41.0         137  |  105  |  6<L>  ----------------------------<  73  4.0   |  19<L>  |  0.72    Ca    8.9      2017 06:50    TPro  7.5  /  Alb  3.6  /  TBili  0.5  /  DBili  x   /  AST  29  /  ALT  20  /  AlkPhos  33<L>  07-11    PT/INR - ( 2017 06:53 )   PT: 12.0 sec;   INR: 1.10 ratio         PTT - ( 2017 06:53 )  PTT:28.7 sec  Urinalysis Basic - ( 2017 19:43 )    Color: Yellow / Appearance: Clear / S.028 / pH: x  Gluc: x / Ketone: Large  / Bili: Negative / Urobili: Negative   Blood: x / Protein: 30 mg/dL / Nitrite: Negative   Leuk Esterase: Negative / RBC: 0-2 /HPF / WBC 2-5 /HPF   Sq Epi: x / Non Sq Epi: x / Bacteria: Few /HPF      CAPILLARY BLOOD GLUCOSE            Urinalysis Basic - ( 2017 19:43 )    Color: Yellow / Appearance: Clear / S.028 / pH: x  Gluc: x / Ketone: Large  / Bili: Negative / Urobili: Negative   Blood: x / Protein: 30 mg/dL / Nitrite: Negative   Leuk Esterase: Negative / RBC: 0-2 /HPF / WBC 2-5 /HPF   Sq Epi: x / Non Sq Epi: x / Bacteria: Few /HPF      REVIEW OF SYSTEMS:  CONSTITUTIONAL: No fever, weight loss, or fatigue  EYES: No eye pain, visual disturbances, or discharge  ENMT:  No difficulty hearing, tinnitus, vertigo; No sinus or throat pain  NECK: No pain or stiffness  BREASTS: No pain, masses, or nipple discharge  RESPIRATORY: No cough, wheezing, chills or hemoptysis; No shortness of breath  CARDIOVASCULAR: No chest pain, palpitations, dizziness, or leg swelling  GASTROINTESTINAL: No abdominal or epigastric pain. but nausea, vomiting,  No diarrhea or constipation. No melena or hematochezia.  GENITOURINARY: No dysuria, frequency, hematuria, or incontinence  NEUROLOGICAL: No headaches, memory loss, loss of strength, numbness, or tremors  SKIN: No itching, burning, rashes, or lesions   LYMPH NODES: No enlarged glands  ENDOCRINE: No heat or cold intolerance; No hair loss  MUSCULOSKELETAL: No joint pain or swelling; No muscle, back, or extremity pain  PSYCHIATRIC: No depression, anxiety, mood swings, or difficulty sleeping  HEME/LYMPH: No easy bruising, or bleeding gums  ALLERY AND IMMUNOLOGIC: No hives or eczema    RADIOLOGY & ADDITIONAL TESTS:    Imaging Personally Reviewed:  [ ] YES  [ ] NO    Consultant(s) Notes Reviewed:  [x ] YES  [ ] NO    PHYSICAL EXAM:  GENERAL: NAD, well-groomed, well-developed  HEAD:  Atraumatic, Normocephalic  EYES: EOMI, PERRLA, conjunctiva and sclera clear  ENMT: No tonsillar erythema, exudates, or enlargement; Moist mucous membranes, Good dentition, No lesions  NECK: Supple, No JVD, Normal thyroid  NERVOUS SYSTEM:  Alert & Oriented X3, Good concentration; Motor Strength 5/5 B/L upper and lower extremities; DTRs 2+ intact and symmetric  CHEST/LUNG: Clear to percussion bilaterally; No rales, rhonchi, wheezing, or rubs  HEART: Regular rate and rhythm; No murmurs, rubs, or gallops  ABDOMEN: Soft, Nontender, Nondistended; Bowel sounds present  EXTREMITIES:  2+ Peripheral Pulses, No clubbing, cyanosis, or edema  LYMPH: No lymphadenopathy noted  SKIN: No rashes or lesions    Care Discussed with Consultants/Other Providers [ x] YES  [ ] NO
INTERVAL HPI/OVERNIGHT EVENTS: Still nauseated.   Vital Signs Last 24 Hrs  T(C): 36.7 (10 Jul 2017 09:27), Max: 37.2 (2017 21:12)  T(F): 98.1 (10 Jul 2017 09:27), Max: 98.9 (2017 21:12)  HR: 73 (10 Jul 2017 09:27) (67 - 90)  BP: 116/72 (10 Jul 2017 09:27) (116/71 - 156/89)  BP(mean): --  RR: 17 (10 Jul 2017 09:27) (16 - 18)  SpO2: 97% (10 Jul 2017 09:27) (97% - 100%)  I&O's Summary    MEDICATIONS  (STANDING):  sodium chloride 0.9%. 1000 milliLiter(s) (100 mL/Hr) IV Continuous <Continuous>  sucralfate 1 Gram(s) Oral four times a day  pantoprazole    Tablet 40 milliGRAM(s) Oral two times a day before meals  enoxaparin Injectable 40 milliGRAM(s) SubCutaneous daily    MEDICATIONS  (PRN):    LABS:                        12.9   3.6   )-----------( 253      ( 2017 17:27 )             37.8     07    137  |  102  |  7   ----------------------------<  81  4.7   |  20<L>  |  0.70    Ca    9.4      2017 17:27    TPro  7.9  /  Alb  3.8  /  TBili  0.6  /  DBili  x   /  AST  37  /  ALT  20  /  AlkPhos  33<L>        Urinalysis Basic - ( 2017 19:43 )    Color: Yellow / Appearance: Clear / S.028 / pH: x  Gluc: x / Ketone: Large  / Bili: Negative / Urobili: Negative   Blood: x / Protein: 30 mg/dL / Nitrite: Negative   Leuk Esterase: Negative / RBC: 0-2 /HPF / WBC 2-5 /HPF   Sq Epi: x / Non Sq Epi: x / Bacteria: Few /HPF      CAPILLARY BLOOD GLUCOSE            Urinalysis Basic - ( 2017 19:43 )    Color: Yellow / Appearance: Clear / S.028 / pH: x  Gluc: x / Ketone: Large  / Bili: Negative / Urobili: Negative   Blood: x / Protein: 30 mg/dL / Nitrite: Negative   Leuk Esterase: Negative / RBC: 0-2 /HPF / WBC 2-5 /HPF   Sq Epi: x / Non Sq Epi: x / Bacteria: Few /HPF        Consultant(s) Notes Reviewed:  [x ] YES  [ ] NO    PHYSICAL EXAM:  GENERAL: NAD, well-groomed, well-developed  HEAD:  Atraumatic, Normocephalic  EYES: EOMI, PERRLA, conjunctiva and sclera clear  ENMT: No tonsillar erythema, exudates, or enlargement; Moist mucous membranes, Good dentition, No lesions  NECK: Supple, No JVD, Normal thyroid  NERVOUS SYSTEM:  Alert & Oriented X3, Good concentration; Motor Strength 5/5 B/L upper and lower extremities; DTRs 2+ intact and symmetric  CHEST/LUNG: Clear to percussion bilaterally; No rales, rhonchi, wheezing, or rubs  HEART: Regular rate and rhythm; No murmurs, rubs, or gallops  ABDOMEN: Soft, Nontender, Nondistended; Bowel sounds present  EXTREMITIES:  2+ Peripheral Pulses, No clubbing, cyanosis, or edema  LYMPH: No lymphadenopathy noted  SKIN: No rashes or lesions    Care Discussed with Consultants/Other Providers [ x] YES  [ ] NO
INTERVAL HPI/OVERNIGHT EVENTS: feeling fine except vomiting. Want go home.   Vital Signs Last 24 Hrs  T(C): 36.7 (13 Jul 2017 14:32), Max: 37.1 (13 Jul 2017 09:00)  T(F): 98 (13 Jul 2017 14:32), Max: 98.7 (13 Jul 2017 09:00)  HR: 72 (13 Jul 2017 14:32) (72 - 86)  BP: 145/86 (13 Jul 2017 14:32) (125/76 - 145/86)  BP(mean): --  RR: 18 (13 Jul 2017 14:32) (16 - 18)  SpO2: 98% (13 Jul 2017 14:32) (98% - 99%)  I&O's Summary    12 Jul 2017 07:01  -  13 Jul 2017 07:00  --------------------------------------------------------  IN: 3000 mL / OUT: 2500 mL / NET: 500 mL    13 Jul 2017 07:01  -  13 Jul 2017 16:40  --------------------------------------------------------  IN: 0 mL / OUT: 0 mL / NET: 0 mL      MEDICATIONS  (STANDING):  sodium chloride 0.9%. 1000 milliLiter(s) (100 mL/Hr) IV Continuous <Continuous>  sucralfate 1 Gram(s) Oral four times a day  pantoprazole    Tablet 40 milliGRAM(s) Oral two times a day before meals  enoxaparin Injectable 40 milliGRAM(s) SubCutaneous daily    MEDICATIONS  (PRN):    LABS:                        12.7   3.1   )-----------( 236      ( 13 Jul 2017 06:31 )             37.4     07-13    136  |  104  |  5<L>  ----------------------------<  83  3.6   |  19<L>  |  0.64    Ca    8.8      13 Jul 2017 06:31          CAPILLARY BLOOD GLUCOSE              REVIEW OF SYSTEMS:  CONSTITUTIONAL: No fever, weight loss, or fatigue  EYES: No eye pain, visual disturbances, or discharge  ENMT:  No difficulty hearing, tinnitus, vertigo; No sinus or throat pain  NECK: No pain or stiffness  BREASTS: No pain, masses, or nipple discharge  RESPIRATORY: No cough, wheezing, chills or hemoptysis; No shortness of breath  CARDIOVASCULAR: No chest pain, palpitations, dizziness, or leg swelling  GASTROINTESTINAL: No abdominal or epigastric pain. +nausea, vomiting, or hematemesis; No diarrhea or constipation. No melena or hematochezia.  GENITOURINARY: No dysuria, frequency, hematuria, or incontinence  NEUROLOGICAL: No headaches, memory loss, loss of strength, numbness, or tremors  SKIN: No itching, burning, rashes, or lesions   LYMPH NODES: No enlarged glands  ENDOCRINE: No heat or cold intolerance; No hair loss  MUSCULOSKELETAL: No joint pain or swelling; No muscle, back, or extremity pain  PSYCHIATRIC: No depression, anxiety, mood swings, or difficulty sleeping  HEME/LYMPH: No easy bruising, or bleeding gums  ALLERY AND IMMUNOLOGIC: No hives or eczema    RADIOLOGY & ADDITIONAL TESTS:    Imaging Personally Reviewed:  [ ] YES  [ ] NO    Consultant(s) Notes Reviewed:  [x ] YES  [ ] NO    PHYSICAL EXAM:  GENERAL: NAD, well-groomed, well-developed  HEAD:  Atraumatic, Normocephalic  EYES: EOMI, PERRLA, conjunctiva and sclera clear  ENMT: No tonsillar erythema, exudates, or enlargement; Moist mucous membranes, Good dentition, No lesions  NECK: Supple, No JVD, Normal thyroid  NERVOUS SYSTEM:  Alert & Oriented X3, Good concentration; Motor Strength 5/5 B/L upper and lower extremities; DTRs 2+ intact and symmetric  CHEST/LUNG: Clear to percussion bilaterally; No rales, rhonchi, wheezing, or rubs  HEART: Regular rate and rhythm; No murmurs, rubs, or gallops  ABDOMEN: Soft, Nontender, Nondistended; Bowel sounds present  EXTREMITIES:  2+ Peripheral Pulses, No clubbing, cyanosis, or edema  LYMPH: No lymphadenopathy noted  SKIN: No rashes or lesions    Care Discussed with Consultants/Other Providers [ x] YES  [ ] NO
INTERVAL HPI/OVERNIGHT EVENTS: still nauseated.   Vital Signs Last 24 Hrs  T(C): 37.3 (12 Jul 2017 13:21), Max: 37.3 (12 Jul 2017 13:21)  T(F): 99.2 (12 Jul 2017 13:21), Max: 99.2 (12 Jul 2017 13:21)  HR: 91 (12 Jul 2017 13:21) (74 - 91)  BP: 155/81 (12 Jul 2017 13:21) (129/85 - 158/80)  BP(mean): --  RR: 16 (12 Jul 2017 13:21) (16 - 18)  SpO2: 99% (12 Jul 2017 13:21) (97% - 100%)  I&O's Summary    11 Jul 2017 07:01  -  12 Jul 2017 07:00  --------------------------------------------------------  IN: 2420 mL / OUT: 0 mL / NET: 2420 mL    12 Jul 2017 07:01  -  12 Jul 2017 16:12  --------------------------------------------------------  IN: 1260 mL / OUT: 700 mL / NET: 560 mL      MEDICATIONS  (STANDING):  sodium chloride 0.9%. 1000 milliLiter(s) (100 mL/Hr) IV Continuous <Continuous>  sucralfate 1 Gram(s) Oral four times a day  pantoprazole    Tablet 40 milliGRAM(s) Oral two times a day before meals  enoxaparin Injectable 40 milliGRAM(s) SubCutaneous daily    MEDICATIONS  (PRN):    LABS:                        13.2   3.0   )-----------( 238      ( 11 Jul 2017 06:53 )             41.0     07-11    137  |  105  |  6<L>  ----------------------------<  73  4.0   |  19<L>  |  0.72    Ca    8.9      11 Jul 2017 06:50    TPro  7.5  /  Alb  3.6  /  TBili  0.5  /  DBili  x   /  AST  29  /  ALT  20  /  AlkPhos  33<L>  07-11    PT/INR - ( 11 Jul 2017 06:53 )   PT: 12.0 sec;   INR: 1.10 ratio         PTT - ( 11 Jul 2017 06:53 )  PTT:28.7 sec    CAPILLARY BLOOD GLUCOSE              Consultant(s) Notes Reviewed:  [x ] YES  [ ] NO    PHYSICAL EXAM:  GENERAL: NAD, well-groomed, well-developed  HEAD:  Atraumatic, Normocephalic  EYES: EOMI, PERRLA, conjunctiva and sclera clear  ENMT: No tonsillar erythema, exudates, or enlargement; Moist mucous membranes, Good dentition, No lesions  NECK: Supple, No JVD, Normal thyroid  NERVOUS SYSTEM:  Alert & Oriented X3, Good concentration; Motor Strength 5/5 B/L upper and lower extremities; DTRs 2+ intact and symmetric  CHEST/LUNG: Clear to percussion bilaterally; No rales, rhonchi, wheezing, or rubs  HEART: Regular rate and rhythm; No murmurs, rubs, or gallops  ABDOMEN: Soft, Nontender, Nondistended; Bowel sounds present  EXTREMITIES:  2+ Peripheral Pulses, No clubbing, cyanosis, or edema  LYMPH: No lymphadenopathy noted  SKIN: No rashes or lesions    Care Discussed with Consultants/Other Providers [ x] YES  [ ] NO
Patient is a 51y old  Female who presents with a chief complaint of vomiting and abdominal pain (09 Jul 2017 19:45)  feels ok  no sob at rest  no events overnight     Any change in ROS:     MEDICATIONS  (STANDING):  sodium chloride 0.9%. 1000 milliLiter(s) (100 mL/Hr) IV Continuous <Continuous>  sucralfate 1 Gram(s) Oral four times a day  pantoprazole    Tablet 40 milliGRAM(s) Oral two times a day before meals  enoxaparin Injectable 40 milliGRAM(s) SubCutaneous daily    MEDICATIONS  (PRN):    Vital Signs Last 24 Hrs  T(C): 36.8 (12 Jul 2017 09:07), Max: 37.1 (11 Jul 2017 20:50)  T(F): 98.3 (12 Jul 2017 09:07), Max: 98.8 (12 Jul 2017 05:12)  HR: 86 (12 Jul 2017 09:07) (68 - 86)  BP: 129/85 (12 Jul 2017 09:07) (127/87 - 158/80)  BP(mean): --  RR: 16 (12 Jul 2017 09:07) (16 - 18)  SpO2: 100% (12 Jul 2017 09:07) (95% - 100%)    I&O's Summary    11 Jul 2017 07:01  -  12 Jul 2017 07:00  --------------------------------------------------------  IN: 2420 mL / OUT: 0 mL / NET: 2420 mL    12 Jul 2017 07:01  -  12 Jul 2017 10:03  --------------------------------------------------------  IN: 540 mL / OUT: 0 mL / NET: 540 mL          Physical Exam:   GENERAL: NAD, well-groomed, well-developed  HEENT: SYLVIA/   Atraumatic, Normocephalic  ENMT: No tonsillar erythema, exudates, or enlargement; Moist mucous membranes, Good dentition, No lesions  NECK: Supple, No JVD, Normal thyroid  CHEST/LUNG: Clear to percussion bilaterally; No rales, rhonchi, wheezing, or rubs  CVS: Regular rate and rhythm; No murmurs, rubs, or gallops  GI: : Soft, Nontender, Nondistended; Bowel sounds present  NERVOUS SYSTEM:  Alert & Oriented X3  EXTREMITIES:  2+ Peripheral Pulses, No clubbing, cyanosis, or edema  LYMPH: No lymphadenopathy noted  SKIN: No rashes or lesions  ENDOCRINOLOGY: No Thyromegaly  PSYCH: Appropriate    Labs:                        13.2   3.0   )-----------( 238      ( 11 Jul 2017 06:53 )             41.0                         12.9   3.6   )-----------( 253      ( 09 Jul 2017 17:27 )             37.8     07-11    137  |  105  |  6<L>  ----------------------------<  73  4.0   |  19<L>  |  0.72  07-09    137  |  102  |  7   ----------------------------<  81  4.7   |  20<L>  |  0.70    Ca    8.9      11 Jul 2017 06:50    TPro  7.5  /  Alb  3.6  /  TBili  0.5  /  DBili  x   /  AST  29  /  ALT  20  /  AlkPhos  33<L>  07-11  TPro  7.9  /  Alb  3.8  /  TBili  0.6  /  DBili  x   /  AST  37  /  ALT  20  /  AlkPhos  33<L>  07-09    CAPILLARY BLOOD GLUCOSE          LIVER FUNCTIONS - ( 11 Jul 2017 06:50 )  Alb: 3.6 g/dL / Pro: 7.5 g/dL / ALK PHOS: 33 U/L / ALT: 20 U/L RC / AST: 29 U/L / GGT: x           PT/INR - ( 11 Jul 2017 06:53 )   PT: 12.0 sec;   INR: 1.10 ratio         PTT - ( 11 Jul 2017 06:53 )  PTT:28.7 sec    Cultures:       Studies  Chest X-RAY  CT SCAN Chest   Venous Dopplers: LE: < from: CT Chest No Cont (07.10.17 @ 16:35) >    IMPRESSION:   0.3 cm solid nodule in the right middle lobe is indeterminate based on   this exam. According to the current guidelines, no further follow-up is   recommended in the absence of known history of malignancy and/or risk   factors, otherwise, follow-up CT scan is recommended in one year. If the   patient has history of malignancy, follow-up examination as indicated by   the guidelines for that malignancy is recommended.    < end of copied text >    CT Abdomen  Others
INTERVAL HPI/OVERNIGHT EVENTS:  Pt S/E -- resting in bed weakened  S/P EGD yesterday  Without abdominal pain, mild nausea with two episodes of nbnb emesis this AM -- decreasing in frequency  Tolerating diet  No GIB    MEDICATIONS  (STANDING):  sodium chloride 0.9%. 1000 milliLiter(s) (100 mL/Hr) IV Continuous <Continuous>  sucralfate 1 Gram(s) Oral four times a day  pantoprazole    Tablet 40 milliGRAM(s) Oral two times a day before meals  enoxaparin Injectable 40 milliGRAM(s) SubCutaneous daily    MEDICATIONS  (PRN): ---      Allergies    No Known Allergies    Intolerances: ----      ROS:   General:  No wt loss, fevers, chills, night sweats,+ fatigue,   Eyes:  Good vision, no reported pain  ENT:  No sore throat, pain, runny nose, +dysphagia  CV:  No pain, palpitations, hypo/hypertension  Resp:  No dyspnea, cough, tachypnea, wheezing  GI:  No pain, +nausea, +vomiting, No diarrhea, No constipation, No weight loss, No fever, No pruritis, No rectal bleeding, No tarry stools, No dysphagia,  :  No pain, bleeding, incontinence, nocturia  Muscle:  No pain,+ weakness  Neuro:  No weakness, tingling, memory problems  Psych:  No fatigue, insomnia, mood problems, depression  Endocrine:  No polyuria, polydipsia, cold/heat intolerance  Heme:  No petechiae, ecchymosis, easy bruisability  Skin:  No rash, tattoos, scars, edema      PHYSICAL EXAM:   Vital Signs:  Vital Signs Last 24 Hrs  T(C): 36.8 (2017 09:07), Max: 37.1 (2017 20:50)  T(F): 98.3 (2017 09:07), Max: 98.8 (2017 05:12)  HR: 86 (2017 09:07) (68 - 86)  BP: 129/85 (2017 09:07) (127/87 - 158/80)  BP(mean): --  RR: 16 (2017 09:07) (16 - 18)  SpO2: 100% (2017 09:07) (97% - 100%)  Daily     Daily Weight in k.9 (2017 09:07)    GENERAL:  Appears stated age, well-groomed, well-nourished, no distress  HEENT:  NC/AT,  conjunctivae clear and pink, no thyromegaly, nodules, adenopathy, no JVD, sclera -anicteric  CHEST:  Full & symmetric excursion, no increased effort, breath sounds clear  HEART:  Regular rhythm, S1, S2, no murmur/rub/S3/S4, no abdominal bruit, no edema  ABDOMEN:  Soft, non-tender, non-distended, normoactive bowel sounds,  no masses ,no hepato-splenomegaly, no signs of chronic liver disease  EXTEREMITIES:  no cyanosis,clubbing or edema  SKIN:  No rash/erythema/ecchymoses/petechiae/wounds/abscess/warm/dry  NEURO:  Alert, oriented, no asterixis, no tremor, no encephalopathy      LABS:                        13.2   3.0   )-----------( 238      ( 2017 06:53 )             41.0         137  |  105  |  6<L>  ----------------------------<  73  4.0   |  19<L>  |  0.72    Ca    8.9      2017 06:50    TPro  7.5  /  Alb  3.6  /  TBili  0.5  /  DBili  x   /  AST  29  /  ALT  20  /  AlkPhos  33<L>  11    PT/INR - ( 2017 06:53 )   PT: 12.0 sec;   INR: 1.10 ratio         PTT - ( 2017 06:53 )  PTT:28.7 sec      RADIOLOGY & ADDITIONAL TESTS:    < from: Upper Endoscopy (17 @ 13:52) >  Findings:       The examined esophagus was normal.       Mild inflammation was found in the gastric antrum. Biopsies were taken with a cold forceps        for histology. Estimated blood loss: none.       Localized mild inflammation was found in the duodenal bulb. Biopsies were taken with a cold        forceps for histology. Estimated blood loss: none.                                                                                                        Impression:          - Normal esophagus.                       - Gastritis. Biopsied.                       - Duodenitis. Biopsied.  Recommendation:      - [facility].                       - Resume previous diet.                       - Do a gastric emptying study.              - DC reglan    < end of copied text >

## 2017-07-13 NOTE — DISCHARGE NOTE ADULT - PLAN OF CARE
improved please continue Carafate and protoxin You had a gastric emptying study test revealed gastropareisis, continue reglan for 2 weeks and follow up with Gastroenterologist please follow up with your primary attending to evaluate low white blood cells. You had a gastric emptying study test revealed gastroparesis, continue Reglan for 2 weeks and follow up with Gastroenterologist

## 2017-07-13 NOTE — DISCHARGE NOTE ADULT - CARE PROVIDER_API CALL
Juanito Vargas (DO), Internal Medicine  [address] [place] [address]  Phone: [phone]  Fax: [phone]

## 2017-07-13 NOTE — PROGRESS NOTE ADULT - ASSESSMENT
51 F w/ pmhx gastritis admitted for intermittent nausea and vomiting x 6 months associated with 40 lb weight loss and intermittent dysphagia to solids/liquids
51 F w/ pmhx gastritis admitted for intermittent nausea and vomiting x 6 months associated with 40 lb weight loss and intermittent dysphagia to solids/liquids
51 f with hx of gastritis pw 6 months hx  N/V. Pt has been vomiting 1-3 times most days for the past 6 months. She has seen GI in the past.  Pt reports that she lost about 40 lbs since then. On workup in [facility] she had ct chest which showed 3 mm RML nodule and since pt is also complaining of SOB off and on, pulmonary called to evaluate

## 2017-07-13 NOTE — PROGRESS NOTE ADULT - PROBLEM SELECTOR PLAN 3
- Continue ppi 40 mg po bid  - Carafate 1 g po 4x/day  - Diet as tolerated
F/U Ct chest in 1 year
Hematology consult noted and work up pending.
Work up in progress.
Work up per GI and will check ct chest also.
- Continue ppi 40 mg po bid  - Carafate 1 g po 4x/day  - Diet as tolerated
for gastric emptying study

## 2017-07-13 NOTE — PROGRESS NOTE ADULT - PROBLEM SELECTOR PLAN 4
Hematology help appreciated so outpt f/u
Pulmonary consulted.
S/P EGD and planned for Gastric Emptying tomorrow.
for gastric emptying study

## 2017-07-13 NOTE — PROGRESS NOTE ADULT - PROBLEM SELECTOR PROBLEM 2
Intractable vomiting with nausea, unspecified vomiting type
Intractable vomiting with nausea, unspecified vomiting type
Shortness of breath
Abdominal pain
Nausea & vomiting
Nausea & vomiting
Shortness of breath

## 2017-07-13 NOTE — DISCHARGE NOTE ADULT - HOSPITAL COURSE
MD to write 51 f with hx of gastritis pw 6 months hx  N/V. Pt has been vomiting 1-3 times most days for the past 6 months. She has seen GI in the past. Initially started on Nexium. Pt had EGD and bx done which showed acute gastritis. Pt was switched to sucralfate, Zofran, ranitidine, Pt also had CT a/p on 6/26/2017 which only shows liver cysts and R ovarian cyst. Pt reports that she lost about 40 lbs since then. Pt had abd hernia repair , hysterectomy and adhesiolysis in 2014.    Dxed with Gastroparesis,Gastritis with duodenitis ,Vomiting ,weight loss ,leucopenia and pulmonary nodule. She was followed by GI,hemtology and Pulmonary . Had CT abdomen outside,but EGD,CT Chest and Gastric emptying tests inpatient. Did well so was dcd home.     EGd :< from: Upper Endoscopy (07.11.17 @ 13:52) >                                                                                                        Findings:       The examined esophagus was normal.       Mild inflammation was found in the gastric antrum. Biopsies were taken with a cold forceps        for histology. Estimated blood loss: none.       Localized mild inflammation was found in the duodenal bulb. Biopsies were taken with a cold        forceps for histology. Estimated blood loss: none.                                                                                                        Impression:          - Normal esophagus.                       - Gastritis. Biopsied.                       - Duodenitis. Biopsied.  Recommendation:      - [facility].                       - Resume previous diet.                       - Do a gastric emptying study.              - DC reglan                                                                                                        Attending Participation:       I personally performed the entire procedure.                                                             _________________  Juanito Vargas MD  7/11/2017 3:16:57 PM    < end of copied text >   Ct chest :< from: CT Chest No Cont (07.10.17 @ 16:35) >    IMPRESSION:   0.3 cm solid nodule in the right middle lobe is indeterminate based on   this exam. According to the current guidelines, no further follow-up is   recommended in the absence of known history of malignancy and/or risk   factors, otherwise, follow-up CT scan is recommended in one year. If the   patient has history of malignancy, follow-up examination as indicated by   the guidelines for that malignancy is recommended.        < end of copied text >   Gastric emptying study: < from: NM Gastric Empty Solid/Liquid (07.13.17 @ 13:39) >      IMPRESSION: Abnormal solid phase gastric emptying study consistent with   gastroparesis.     Normal gastric emptying of liquids.      < end of copied text >

## 2017-07-13 NOTE — DISCHARGE NOTE ADULT - MEDICATION SUMMARY - MEDICATIONS TO STOP TAKING
I will STOP taking the medications listed below when I get home from the hospital:    raNITIdine 300 mg oral tablet  --  by mouth    Zofran 4 mg oral tablet  --  by mouth

## 2017-07-13 NOTE — PROGRESS NOTE ADULT - PROBLEM SELECTOR PLAN 1
- 40 lb weight loss noted within the last year  - Heme onc [facility]
Awaiting Gastric emptying study. S/P EGD
Pulmonary consult noted and f/u CT scan in 1 year.
Reglan 5mg q6hrs for 2 weeks per GI .
S/P EGD and CT scan done . GI consult pending.
- 40 lb weight loss noted within the last year  - Heme onc [facility]
non smoker, pretty small pulmonary nodule, not in PET SCAN RESOLUTION SIZE; only single with no underlying malignancy so far: at least at this time, with the given information, ti needs to be followed up as an outpatient, unless some new malignancy is found on current ongoing workup.

## 2017-07-15 LAB
ANA PAT FLD IF-IMP: ABNORMAL
ANA TITR SER: ABNORMAL

## 2017-07-18 LAB — SURGICAL PATHOLOGY STUDY: SIGNIFICANT CHANGE UP

## 2017-07-24 ENCOUNTER — APPOINTMENT (OUTPATIENT)
Dept: GASTROENTEROLOGY | Facility: CLINIC | Age: 52
End: 2017-07-24
Payer: COMMERCIAL

## 2017-07-24 VITALS
TEMPERATURE: 98.3 F | DIASTOLIC BLOOD PRESSURE: 80 MMHG | HEIGHT: 68 IN | SYSTOLIC BLOOD PRESSURE: 110 MMHG | WEIGHT: 136 LBS | BODY MASS INDEX: 20.61 KG/M2

## 2017-07-24 DIAGNOSIS — Z82.49 FAMILY HISTORY OF ISCHEMIC HEART DISEASE AND OTHER DISEASES OF THE CIRCULATORY SYSTEM: ICD-10-CM

## 2017-07-24 DIAGNOSIS — Z71.9 COUNSELING, UNSPECIFIED: ICD-10-CM

## 2017-07-24 DIAGNOSIS — Z28.21 IMMUNIZATION NOT CARRIED OUT BECAUSE OF PATIENT REFUSAL: ICD-10-CM

## 2017-07-24 DIAGNOSIS — Z83.3 FAMILY HISTORY OF DIABETES MELLITUS: ICD-10-CM

## 2017-07-24 PROCEDURE — 99245 OFF/OP CONSLTJ NEW/EST HI 55: CPT

## 2017-07-24 RX ORDER — METOCLOPRAMIDE 5 MG/1
5 TABLET ORAL 4 TIMES DAILY
Qty: 120 | Refills: 0 | Status: DISCONTINUED | COMMUNITY
Start: 2017-07-24 | End: 2017-07-24

## 2017-07-25 ENCOUNTER — FORM ENCOUNTER (OUTPATIENT)
Age: 52
End: 2017-07-25

## 2017-07-25 ENCOUNTER — RX RENEWAL (OUTPATIENT)
Age: 52
End: 2017-07-25

## 2017-07-26 ENCOUNTER — APPOINTMENT (OUTPATIENT)
Dept: RADIOLOGY | Facility: HOSPITAL | Age: 52
End: 2017-07-26

## 2017-07-26 ENCOUNTER — OUTPATIENT (OUTPATIENT)
Dept: OUTPATIENT SERVICES | Facility: HOSPITAL | Age: 52
LOS: 1 days | End: 2017-07-26

## 2017-07-26 DIAGNOSIS — Z90.710 ACQUIRED ABSENCE OF BOTH CERVIX AND UTERUS: Chronic | ICD-10-CM

## 2017-07-26 DIAGNOSIS — K31.84 GASTROPARESIS: ICD-10-CM

## 2017-08-07 ENCOUNTER — APPOINTMENT (OUTPATIENT)
Dept: GASTROENTEROLOGY | Facility: CLINIC | Age: 52
End: 2017-08-07
Payer: COMMERCIAL

## 2017-08-07 VITALS
WEIGHT: 126 LBS | HEIGHT: 68 IN | SYSTOLIC BLOOD PRESSURE: 100 MMHG | DIASTOLIC BLOOD PRESSURE: 70 MMHG | TEMPERATURE: 98.9 F | BODY MASS INDEX: 19.1 KG/M2

## 2017-08-07 DIAGNOSIS — Z71.89 OTHER SPECIFIED COUNSELING: ICD-10-CM

## 2017-08-07 PROCEDURE — 99213 OFFICE O/P EST LOW 20 MIN: CPT

## 2017-08-10 PROBLEM — Z71.89 ENCOUNTER TO DISCUSS TEST RESULTS: Status: ACTIVE | Noted: 2017-08-07

## 2017-09-12 ENCOUNTER — EMERGENCY (EMERGENCY)
Facility: HOSPITAL | Age: 52
LOS: 1 days | End: 2017-09-12
Attending: EMERGENCY MEDICINE | Admitting: EMERGENCY MEDICINE
Payer: COMMERCIAL

## 2017-09-12 VITALS
TEMPERATURE: 98 F | SYSTOLIC BLOOD PRESSURE: 125 MMHG | RESPIRATION RATE: 16 BRPM | OXYGEN SATURATION: 100 % | HEART RATE: 99 BPM | DIASTOLIC BLOOD PRESSURE: 84 MMHG

## 2017-09-12 DIAGNOSIS — Z90.710 ACQUIRED ABSENCE OF BOTH CERVIX AND UTERUS: Chronic | ICD-10-CM

## 2017-09-12 LAB
APPEARANCE UR: CLEAR — SIGNIFICANT CHANGE UP
BACTERIA # UR AUTO: ABNORMAL /HPF
BILIRUB UR-MCNC: NEGATIVE — SIGNIFICANT CHANGE UP
COLOR SPEC: SIGNIFICANT CHANGE UP
DIFF PNL FLD: NEGATIVE — SIGNIFICANT CHANGE UP
EPI CELLS # UR: SIGNIFICANT CHANGE UP /HPF
GLUCOSE UR QL: NEGATIVE — SIGNIFICANT CHANGE UP
HCG UR QL: NEGATIVE — SIGNIFICANT CHANGE UP
KETONES UR-MCNC: ABNORMAL
LEUKOCYTE ESTERASE UR-ACNC: NEGATIVE — SIGNIFICANT CHANGE UP
NITRITE UR-MCNC: NEGATIVE — SIGNIFICANT CHANGE UP
PH UR: 7 — SIGNIFICANT CHANGE UP (ref 5–8)
PROT UR-MCNC: SIGNIFICANT CHANGE UP
SP GR SPEC: 1.01 — SIGNIFICANT CHANGE UP (ref 1.01–1.02)
UROBILINOGEN FLD QL: NEGATIVE — SIGNIFICANT CHANGE UP
WBC UR QL: SIGNIFICANT CHANGE UP /HPF (ref 0–5)

## 2017-09-12 PROCEDURE — 84295 ASSAY OF SERUM SODIUM: CPT

## 2017-09-12 PROCEDURE — 84484 ASSAY OF TROPONIN QUANT: CPT

## 2017-09-12 PROCEDURE — 83605 ASSAY OF LACTIC ACID: CPT

## 2017-09-12 PROCEDURE — 80053 COMPREHEN METABOLIC PANEL: CPT

## 2017-09-12 PROCEDURE — 70450 CT HEAD/BRAIN W/O DYE: CPT | Mod: 26

## 2017-09-12 PROCEDURE — 84132 ASSAY OF SERUM POTASSIUM: CPT

## 2017-09-12 PROCEDURE — 83690 ASSAY OF LIPASE: CPT

## 2017-09-12 PROCEDURE — 96375 TX/PRO/DX INJ NEW DRUG ADDON: CPT

## 2017-09-12 PROCEDURE — 81025 URINE PREGNANCY TEST: CPT

## 2017-09-12 PROCEDURE — 96374 THER/PROPH/DIAG INJ IV PUSH: CPT

## 2017-09-12 PROCEDURE — 82330 ASSAY OF CALCIUM: CPT

## 2017-09-12 PROCEDURE — 82803 BLOOD GASES ANY COMBINATION: CPT

## 2017-09-12 PROCEDURE — 81001 URINALYSIS AUTO W/SCOPE: CPT

## 2017-09-12 PROCEDURE — 85014 HEMATOCRIT: CPT

## 2017-09-12 PROCEDURE — 99285 EMERGENCY DEPT VISIT HI MDM: CPT

## 2017-09-12 PROCEDURE — 70450 CT HEAD/BRAIN W/O DYE: CPT

## 2017-09-12 PROCEDURE — 85027 COMPLETE CBC AUTOMATED: CPT

## 2017-09-12 PROCEDURE — 82435 ASSAY OF BLOOD CHLORIDE: CPT

## 2017-09-12 PROCEDURE — 82947 ASSAY GLUCOSE BLOOD QUANT: CPT

## 2017-09-12 PROCEDURE — 99284 EMERGENCY DEPT VISIT MOD MDM: CPT | Mod: 25

## 2017-09-12 RX ORDER — METOCLOPRAMIDE HCL 10 MG
10 TABLET ORAL ONCE
Qty: 0 | Refills: 0 | Status: COMPLETED | OUTPATIENT
Start: 2017-09-12 | End: 2017-09-12

## 2017-09-12 RX ORDER — ONDANSETRON 8 MG/1
1 TABLET, FILM COATED ORAL
Qty: 9 | Refills: 0 | OUTPATIENT
Start: 2017-09-12 | End: 2017-09-15

## 2017-09-12 RX ORDER — SODIUM CHLORIDE 9 MG/ML
1000 INJECTION INTRAMUSCULAR; INTRAVENOUS; SUBCUTANEOUS ONCE
Qty: 0 | Refills: 0 | Status: COMPLETED | OUTPATIENT
Start: 2017-09-12 | End: 2017-09-12

## 2017-09-12 RX ORDER — DEXAMETHASONE 0.5 MG/5ML
10 ELIXIR ORAL ONCE
Qty: 0 | Refills: 0 | Status: DISCONTINUED | OUTPATIENT
Start: 2017-09-12 | End: 2017-09-16

## 2017-09-12 RX ORDER — MECLIZINE HCL 12.5 MG
1 TABLET ORAL
Qty: 15 | Refills: 0 | OUTPATIENT
Start: 2017-09-12 | End: 2017-09-17

## 2017-09-12 RX ADMIN — SODIUM CHLORIDE 2000 MILLILITER(S): 9 INJECTION INTRAMUSCULAR; INTRAVENOUS; SUBCUTANEOUS at 17:28

## 2017-09-12 RX ADMIN — Medication 10 MILLIGRAM(S): at 17:28

## 2017-09-25 ENCOUNTER — APPOINTMENT (OUTPATIENT)
Dept: GASTROENTEROLOGY | Facility: CLINIC | Age: 52
End: 2017-09-25

## 2017-12-04 ENCOUNTER — APPOINTMENT (OUTPATIENT)
Dept: GASTROENTEROLOGY | Facility: CLINIC | Age: 52
End: 2017-12-04
Payer: COMMERCIAL

## 2017-12-04 VITALS
DIASTOLIC BLOOD PRESSURE: 70 MMHG | SYSTOLIC BLOOD PRESSURE: 110 MMHG | WEIGHT: 156 LBS | OXYGEN SATURATION: 98 % | TEMPERATURE: 98.3 F | HEART RATE: 104 BPM | BODY MASS INDEX: 23.72 KG/M2

## 2017-12-04 DIAGNOSIS — R76.8 OTHER SPECIFIED ABNORMAL IMMUNOLOGICAL FINDINGS IN SERUM: ICD-10-CM

## 2017-12-04 DIAGNOSIS — R74.0 NONSPECIFIC ELEVATION OF LEVELS OF TRANSAMINASE AND LACTIC ACID DEHYDROGENASE [LDH]: ICD-10-CM

## 2017-12-04 DIAGNOSIS — Z80.0 FAMILY HISTORY OF MALIGNANT NEOPLASM OF DIGESTIVE ORGANS: ICD-10-CM

## 2017-12-04 DIAGNOSIS — Z86.73 PERSONAL HISTORY OF TRANSIENT ISCHEMIC ATTACK (TIA), AND CEREBRAL INFARCTION W/OUT RESIDUAL DEFICITS: ICD-10-CM

## 2017-12-04 DIAGNOSIS — Z78.9 OTHER SPECIFIED HEALTH STATUS: ICD-10-CM

## 2017-12-04 DIAGNOSIS — R11.10 VOMITING, UNSPECIFIED: ICD-10-CM

## 2017-12-04 DIAGNOSIS — H53.8 OTHER VISUAL DISTURBANCES: ICD-10-CM

## 2017-12-04 DIAGNOSIS — Z82.3 FAMILY HISTORY OF STROKE: ICD-10-CM

## 2017-12-04 DIAGNOSIS — K31.84 GASTROPARESIS: ICD-10-CM

## 2017-12-04 DIAGNOSIS — Z87.19 PERSONAL HISTORY OF OTHER DISEASES OF THE DIGESTIVE SYSTEM: ICD-10-CM

## 2017-12-04 DIAGNOSIS — Z87.898 PERSONAL HISTORY OF OTHER SPECIFIED CONDITIONS: ICD-10-CM

## 2017-12-04 PROCEDURE — 99204 OFFICE O/P NEW MOD 45 MIN: CPT | Mod: 25

## 2017-12-04 PROCEDURE — 36415 COLL VENOUS BLD VENIPUNCTURE: CPT

## 2017-12-04 PROCEDURE — 99214 OFFICE O/P EST MOD 30 MIN: CPT | Mod: 25

## 2017-12-04 RX ORDER — PROCHLORPERAZINE MALEATE 5 MG/1
5 TABLET ORAL
Qty: 180 | Refills: 0 | Status: COMPLETED | COMMUNITY
Start: 2017-07-24 | End: 2017-12-04

## 2017-12-04 RX ORDER — MULTIVITAMIN
TABLET ORAL
Refills: 0 | Status: ACTIVE | COMMUNITY

## 2017-12-04 RX ORDER — AMLODIPINE BESYLATE 10 MG/1
10 TABLET ORAL
Refills: 0 | Status: ACTIVE | COMMUNITY

## 2017-12-04 RX ORDER — PANTOPRAZOLE 40 MG/1
40 TABLET, DELAYED RELEASE ORAL
Qty: 60 | Refills: 5 | Status: COMPLETED | COMMUNITY
Start: 2017-07-25 | End: 2017-12-04

## 2017-12-04 RX ORDER — METOPROLOL SUCCINATE 100 MG/1
100 TABLET, EXTENDED RELEASE ORAL
Refills: 0 | Status: ACTIVE | COMMUNITY

## 2017-12-04 RX ORDER — MYCOPHENOLATE MOFETIL 500 MG/1
500 TABLET ORAL
Qty: 3 | Refills: 0 | Status: ACTIVE | COMMUNITY

## 2017-12-04 RX ORDER — SUCRALFATE 1 G/1
1 TABLET ORAL
Qty: 120 | Refills: 3 | Status: COMPLETED | COMMUNITY
End: 2017-12-04

## 2017-12-04 RX ORDER — HYDROCHLOROTHIAZIDE 25 MG/1
25 TABLET ORAL
Refills: 0 | Status: ACTIVE | COMMUNITY

## 2017-12-06 LAB
ALBUMIN SERPL ELPH-MCNC: 3.7 G/DL
ALP BLD-CCNC: 60 U/L
ALT SERPL-CCNC: 30 U/L
ANA PAT FLD IF-IMP: ABNORMAL
ANA SER IF-ACNC: ABNORMAL
AST SERPL-CCNC: 27 U/L
BILIRUB DIRECT SERPL-MCNC: 0.1 MG/DL
BILIRUB INDIRECT SERPL-MCNC: NORMAL
BILIRUB SERPL-MCNC: <0.2 MG/DL
HAV IGG+IGM SER QL: NONREACTIVE
HBV CORE IGG+IGM SER QL: NONREACTIVE
PROT SERPL-MCNC: 7.1 G/DL

## 2017-12-07 LAB — HEV AB SER QL: NEGATIVE

## 2017-12-08 PROBLEM — Z78.9 SOCIAL ALCOHOL USE: Status: ACTIVE | Noted: 2017-12-08

## 2017-12-08 PROBLEM — R76.8 ELEVATED ANTINUCLEAR ANTIBODY (ANA) LEVEL: Status: ACTIVE | Noted: 2017-12-08

## 2017-12-12 ENCOUNTER — MESSAGE (OUTPATIENT)
Age: 52
End: 2017-12-12

## 2017-12-12 PROBLEM — R74.0 ELEVATED TRANSAMINASE LEVEL: Status: ACTIVE | Noted: 2017-12-12

## 2017-12-13 ENCOUNTER — MESSAGE (OUTPATIENT)
Age: 52
End: 2017-12-13

## 2018-07-23 PROBLEM — Z86.73 HISTORY OF STROKE: Status: RESOLVED | Noted: 2017-12-04 | Resolved: 2018-07-23

## 2018-07-23 PROBLEM — Z80.0 FAMILY HISTORY OF COLON CANCER: Status: INACTIVE | Noted: 2017-12-04

## 2019-06-05 NOTE — PATIENT PROFILE ADULT. - PURPOSEFUL PROACTIVE ROUNDING
Pharmacy states Accu-chek Shelly Plus strips has been rejected by insurance. Please change the prescription if appropriate.  Alhaji Tompkins   Patient

## 2019-11-27 PROBLEM — Z28.21 INFLUENZA VACCINATION DECLINED: Status: RESOLVED | Noted: 2017-07-24 | Resolved: 2019-11-27

## 2020-01-25 NOTE — ED ADULT NURSE NOTE - BREATHING, MLM
[Ambulatory and capable of all self care but unable to carry out any work activities] : Status 2- Ambulatory and capable of all self care but unable to carry out any work activities. Up and about more than 50% of waking hours [Normal] : grossly intact [de-identified] : right sided exquisite tenderness w paracervical spasm [de-identified] : no overt ecchymosis /trauma [de-identified] : no radiational pain on deep palp of strnum/pressure Spontaneous, unlabored and symmetrical

## 2020-08-06 PROBLEM — D25.9 LEIOMYOMA OF UTERUS, UNSPECIFIED: Chronic | Status: ACTIVE | Noted: 2017-07-09

## 2020-08-31 ENCOUNTER — APPOINTMENT (OUTPATIENT)
Dept: GASTROENTEROLOGY | Facility: CLINIC | Age: 55
End: 2020-08-31
Payer: COMMERCIAL

## 2020-08-31 VITALS
WEIGHT: 166 LBS | OXYGEN SATURATION: 100 % | DIASTOLIC BLOOD PRESSURE: 84 MMHG | HEART RATE: 75 BPM | TEMPERATURE: 98.2 F | HEIGHT: 68 IN | BODY MASS INDEX: 25.16 KG/M2 | SYSTOLIC BLOOD PRESSURE: 145 MMHG

## 2020-08-31 DIAGNOSIS — K21.0 GASTRO-ESOPHAGEAL REFLUX DISEASE WITH ESOPHAGITIS: ICD-10-CM

## 2020-08-31 DIAGNOSIS — K63.5 POLYP OF COLON: ICD-10-CM

## 2020-08-31 DIAGNOSIS — K31.84 GASTROPARESIS: ICD-10-CM

## 2020-08-31 DIAGNOSIS — Z00.00 ENCOUNTER FOR GENERAL ADULT MEDICAL EXAMINATION W/OUT ABNORMAL FINDINGS: ICD-10-CM

## 2020-08-31 DIAGNOSIS — I10 ESSENTIAL (PRIMARY) HYPERTENSION: ICD-10-CM

## 2020-08-31 DIAGNOSIS — Z12.11 ENCOUNTER FOR SCREENING FOR MALIGNANT NEOPLASM OF COLON: ICD-10-CM

## 2020-08-31 PROCEDURE — 99213 OFFICE O/P EST LOW 20 MIN: CPT

## 2020-08-31 RX ORDER — ASPIRIN 81 MG
81 TABLET, DELAYED RELEASE (ENTERIC COATED) ORAL
Refills: 0 | Status: DISCONTINUED | COMMUNITY
End: 2020-08-31

## 2020-08-31 RX ORDER — ESOMEPRAZOLE MAGNESIUM 40 MG/1
40 CAPSULE, DELAYED RELEASE ORAL
Refills: 0 | Status: DISCONTINUED | COMMUNITY
End: 2020-08-31

## 2020-08-31 RX ORDER — IBUPROFEN 800 MG/1
800 TABLET, FILM COATED ORAL
Refills: 0 | Status: DISCONTINUED | COMMUNITY
End: 2020-08-31

## 2020-08-31 RX ORDER — PREDNISONE 10 MG/1
10 TABLET ORAL
Qty: 5 | Refills: 0 | Status: DISCONTINUED | COMMUNITY
End: 2020-08-31

## 2020-08-31 NOTE — HISTORY OF PRESENT ILLNESS
[de-identified] : 55 year old woman presents for a screening colonoscopy. Her last colonoscopy was over 10 years ago. She has a history of colon polyps. She denies rectal bleeding, melena or hematemesis. She was previously seen for nondiabetic gastroparesis and GERD but her symptoms have improved and she is off all medication. she is being teated for benign hypertension.

## 2020-08-31 NOTE — PHYSICAL EXAM
[General Appearance - Alert] : alert [General Appearance - In No Acute Distress] : in no acute distress [Sclera] : the sclera and conjunctiva were normal [PERRL With Normal Accommodation] : pupils were equal in size, round, and reactive to light [Extraocular Movements] : extraocular movements were intact [Outer Ear] : the ears and nose were normal in appearance [Oropharynx] : the oropharynx was normal [Neck Appearance] : the appearance of the neck was normal [Neck Cervical Mass (___cm)] : no neck mass was observed [Jugular Venous Distention Increased] : there was no jugular-venous distention [Thyroid Diffuse Enlargement] : the thyroid was not enlarged [Thyroid Nodule] : there were no palpable thyroid nodules [Auscultation Breath Sounds / Voice Sounds] : lungs were clear to auscultation bilaterally [Heart Rate And Rhythm] : heart rate was normal and rhythm regular [Heart Sounds] : normal S1 and S2 [Heart Sounds Gallop] : no gallops [Murmurs] : no murmurs [Heart Sounds Pericardial Friction Rub] : no pericardial rub [Bowel Sounds] : normal bowel sounds [Abdomen Soft] : soft [Abdomen Tenderness] : non-tender [] : no hepato-splenomegaly [Abdomen Mass (___ Cm)] : no abdominal mass palpated [Cervical Lymph Nodes Enlarged Anterior Bilaterally] : anterior cervical [Cervical Lymph Nodes Enlarged Posterior Bilaterally] : posterior cervical [Supraclavicular Lymph Nodes Enlarged Bilaterally] : supraclavicular [No CVA Tenderness] : no ~M costovertebral angle tenderness [No Spinal Tenderness] : no spinal tenderness [Abnormal Walk] : normal gait [Nail Clubbing] : no clubbing  or cyanosis of the fingernails [Musculoskeletal - Swelling] : no joint swelling seen [Motor Tone] : muscle strength and tone were normal

## 2020-09-25 ENCOUNTER — APPOINTMENT (OUTPATIENT)
Dept: DISASTER EMERGENCY | Facility: CLINIC | Age: 55
End: 2020-09-25

## 2020-09-25 DIAGNOSIS — Z01.818 ENCOUNTER FOR OTHER PREPROCEDURAL EXAMINATION: ICD-10-CM

## 2020-09-26 LAB — SARS-COV-2 N GENE NPH QL NAA+PROBE: NOT DETECTED

## 2020-09-30 ENCOUNTER — APPOINTMENT (OUTPATIENT)
Dept: GASTROENTEROLOGY | Facility: AMBULATORY MEDICAL SERVICES | Age: 55
End: 2020-09-30
Payer: COMMERCIAL

## 2020-09-30 PROCEDURE — 45378 DIAGNOSTIC COLONOSCOPY: CPT | Mod: 33

## 2022-06-21 ENCOUNTER — APPOINTMENT (OUTPATIENT)
Dept: OTOLARYNGOLOGY | Facility: CLINIC | Age: 57
End: 2022-06-21

## 2022-06-21 NOTE — HISTORY OF PRESENT ILLNESS
[de-identified] : 56 year old female \par Denies otalgia, otorrhea, ear infections, hearing loss, tinnitus, dizziness, vertigo, headaches related to hearing.

## 2022-07-18 ENCOUNTER — APPOINTMENT (OUTPATIENT)
Dept: NEUROLOGY | Facility: CLINIC | Age: 57
End: 2022-07-18

## 2022-07-18 VITALS
BODY MASS INDEX: 25.91 KG/M2 | WEIGHT: 171 LBS | HEART RATE: 76 BPM | SYSTOLIC BLOOD PRESSURE: 129 MMHG | HEIGHT: 68 IN | DIASTOLIC BLOOD PRESSURE: 83 MMHG

## 2022-07-18 DIAGNOSIS — I63.9 CEREBRAL INFARCTION, UNSPECIFIED: ICD-10-CM

## 2022-07-18 PROCEDURE — 99205 OFFICE O/P NEW HI 60 MIN: CPT

## 2022-07-19 ENCOUNTER — NON-APPOINTMENT (OUTPATIENT)
Age: 57
End: 2022-07-19

## 2022-08-03 ENCOUNTER — OUTPATIENT (OUTPATIENT)
Dept: OUTPATIENT SERVICES | Facility: HOSPITAL | Age: 57
LOS: 1 days | End: 2022-08-03
Payer: COMMERCIAL

## 2022-08-03 ENCOUNTER — APPOINTMENT (OUTPATIENT)
Dept: MRI IMAGING | Facility: CLINIC | Age: 57
End: 2022-08-03

## 2022-08-03 DIAGNOSIS — R51.9 HEADACHE, UNSPECIFIED: ICD-10-CM

## 2022-08-03 DIAGNOSIS — Z90.710 ACQUIRED ABSENCE OF BOTH CERVIX AND UTERUS: Chronic | ICD-10-CM

## 2022-08-03 PROCEDURE — 70544 MR ANGIOGRAPHY HEAD W/O DYE: CPT | Mod: 26,59

## 2022-08-03 PROCEDURE — 70551 MRI BRAIN STEM W/O DYE: CPT | Mod: 26

## 2022-08-03 PROCEDURE — 70549 MR ANGIOGRAPH NECK W/O&W/DYE: CPT | Mod: 26

## 2022-08-03 PROCEDURE — 70551 MRI BRAIN STEM W/O DYE: CPT

## 2022-08-03 PROCEDURE — A9585: CPT

## 2022-08-03 PROCEDURE — 70544 MR ANGIOGRAPHY HEAD W/O DYE: CPT

## 2022-08-03 PROCEDURE — 70549 MR ANGIOGRAPH NECK W/O&W/DYE: CPT

## 2022-08-04 ENCOUNTER — NON-APPOINTMENT (OUTPATIENT)
Age: 57
End: 2022-08-04

## 2022-08-18 ENCOUNTER — APPOINTMENT (OUTPATIENT)
Dept: OTOLARYNGOLOGY | Facility: CLINIC | Age: 57
End: 2022-08-18

## 2022-08-18 VITALS
BODY MASS INDEX: 25.76 KG/M2 | SYSTOLIC BLOOD PRESSURE: 135 MMHG | HEIGHT: 68 IN | WEIGHT: 170 LBS | HEART RATE: 75 BPM | DIASTOLIC BLOOD PRESSURE: 75 MMHG

## 2022-08-18 DIAGNOSIS — J34.2 DEVIATED NASAL SEPTUM: ICD-10-CM

## 2022-08-18 DIAGNOSIS — R09.81 NASAL CONGESTION: ICD-10-CM

## 2022-08-18 PROCEDURE — 99213 OFFICE O/P EST LOW 20 MIN: CPT | Mod: 25

## 2022-08-18 PROCEDURE — 31231 NASAL ENDOSCOPY DX: CPT

## 2022-08-18 RX ORDER — FLUTICASONE PROPIONATE 50 MCG
SPRAY, SUSPENSION NASAL
Refills: 0 | Status: ACTIVE | COMMUNITY

## 2022-08-18 RX ORDER — AZELASTINE HYDROCHLORIDE 137 UG/1
SPRAY, METERED NASAL
Refills: 0 | Status: ACTIVE | COMMUNITY

## 2022-08-18 RX ORDER — POLYETHYLENE GLYCOL 3350, SODIUM SULFATE, SODIUM CHLORIDE, POTASSIUM CHLORIDE, ASCORBIC ACID, SODIUM ASCORBATE 140-9-5.2G
140 KIT ORAL
Qty: 1 | Refills: 0 | Status: DISCONTINUED | COMMUNITY
Start: 2020-08-31 | End: 2022-08-18

## 2022-08-19 ENCOUNTER — APPOINTMENT (OUTPATIENT)
Dept: NEUROLOGY | Facility: CLINIC | Age: 57
End: 2022-08-19

## 2022-08-19 VITALS
WEIGHT: 170 LBS | SYSTOLIC BLOOD PRESSURE: 146 MMHG | HEART RATE: 78 BPM | BODY MASS INDEX: 25.76 KG/M2 | DIASTOLIC BLOOD PRESSURE: 92 MMHG | HEIGHT: 68 IN

## 2022-08-19 DIAGNOSIS — Z86.69 PERSONAL HISTORY OF OTHER DISEASES OF THE NERVOUS SYSTEM AND SENSE ORGANS: ICD-10-CM

## 2022-08-19 DIAGNOSIS — R51.9 HEADACHE, UNSPECIFIED: ICD-10-CM

## 2022-08-19 PROCEDURE — 99214 OFFICE O/P EST MOD 30 MIN: CPT

## 2022-08-21 PROBLEM — J34.2 NASAL SEPTAL DEVIATION: Status: ACTIVE | Noted: 2022-08-21

## 2022-08-21 PROBLEM — R09.81 CHRONIC NASAL CONGESTION: Status: ACTIVE | Noted: 2022-08-21

## 2022-08-21 RX ORDER — HYDROXYCHLOROQUINE SULFATE 200 MG/1
200 TABLET, FILM COATED ORAL
Refills: 0 | Status: DISCONTINUED | COMMUNITY
End: 2022-08-21

## 2022-08-21 RX ORDER — SOD CHLOR,BICARB/SQUEEZ BOTTLE
PACKET, WITH RINSE DEVICE NASAL
Qty: 1 | Refills: 3 | Status: ACTIVE | COMMUNITY
Start: 2022-08-21 | End: 1900-01-01

## 2022-08-21 NOTE — HISTORY OF PRESENT ILLNESS
[de-identified] : 57 year old female presents for initial evaluation for headaches. \par Reports that whenever she coughs, sneeze or bends down she gets a headache that lasts about 3-4 seconds. \par Reports use of Azelastine BID and Flonase daily without relief. using it since 3-4 months\par Denies nasal congestion, sinus pain/pressure, epistaxis, post nasal drip, nasal discharge, recent fevers or ear/nose/throat. Denies history of recurrent sinus infections.

## 2022-08-21 NOTE — ASSESSMENT
[FreeTextEntry1] : 57Y F with nasal congestion 2/2 Nasal septal deviation. 8/2022 MRI Brain reviewed shows no sinus opacification. Headache unlikely related to sinus\par \par Recommend:\par Nasal Congestion\par -Continue Flonase- Azelastine nasal spray to be used after completing daily Sinus Rinse\par -Discussed the importance of rinsing the sinus before using nasal spray so that excess mucus lining the nasal cavity can be wash away so medication can penetration the nose.\par -If normal saline sinus rinse + nasal spray is not effective can discuss medicated nasal rinses and imaging for additional evaluation\par \par -Return to clinic in 3 months or sooner if new/worsen symptoms present\par

## 2022-08-23 NOTE — REVIEW OF SYSTEMS
[As Noted in HPI] : as noted in HPI [Sleep Disturbances] : sleep disturbances [Negative] : Heme/Lymph [Suicidal] : not suicidal

## 2022-08-23 NOTE — ASSESSMENT
[FreeTextEntry1] : Patient presents today for evaluation of headaches which  she has had for many years. more present over the past year, failed multiple medications and does not wish to try nortriptyline and will be continue with vitamin therapy magnesium 400mg and vitamin b2 400mg. trial ubrevly, HA diary \par contact me if there are any changes in the quality or severity of the headaches.\par All questions answered, understanding verbalized.Patient in agreement with plan of care\par F/u 1mth

## 2022-08-23 NOTE — PHYSICAL EXAM
[General Appearance - In No Acute Distress] : in no acute distress [Person] : oriented to person [Place] : oriented to place [Time] : oriented to time [Short Term Intact] : short term memory intact [Current Events] : adequate knowledge of current events [Cranial Nerves Optic (II)] : visual acuity intact bilaterally,  visual fields full to confrontation, pupils equal round and reactive to light [Cranial Nerves Oculomotor (III)] : extraocular motion intact [Cranial Nerves Trigeminal (V)] : facial sensation intact symmetrically [Cranial Nerves Facial (VII)] : face symmetrical [Cranial Nerves Vestibulocochlear (VIII)] : hearing was intact bilaterally [Cranial Nerves Glossopharyngeal (IX)] : tongue and palate midline [Cranial Nerves Accessory (XI - Cranial And Spinal)] : head turning and shoulder shrug symmetric [Cranial Nerves Hypoglossal (XII)] : there was no tongue deviation with protrusion [Motor Strength] : muscle strength was normal in all four extremities [Motor Handedness Right-Handed] : the patient is right hand dominant [Abnormal Walk] : normal gait [2+] : Ankle jerk left 2+ [PERRL With Normal Accommodation] : pupils were equal in size, round, reactive to light, with normal accommodation [Extraocular Movements] : extraocular movements were intact [] : no respiratory distress [Edema] : there was no peripheral edema [FreeTextEntry1] : left trap msk ttp, mild restrict rom

## 2022-08-23 NOTE — HISTORY OF PRESENT ILLNESS
[FreeTextEntry1] : 57 year old f with Pmhx HTN, neuromyelitis optica, possible Sjogren's syndrome, gastroparesis, ?CVA in 2017  presents today for headaches \par \par  she was previously seen by Jenna Mora for MICHAEL and ? stroke that she was told to have had in 2017 at [facility]. (does not have documentation) she completed Brain MRI which reported normal and MRA with  3mm saccular aneurysm (known to have had this in 2017)  was to see Jayde Zarate which she has not nor has she seen  her vascular Dr at [facility].\par she reports she has been taking magnesium  and b2, but does not know the doses. has not had any changes in her headaches.\par \par 2012  dx with migraine headaches, was given  Fioricet which helped. had these for a couple of years, same location tried multiple meds in the past without bowen by her current neurologist at [facility].  refused nortriptyline as it is an antidepressant. \par past 3 years HA more frequent also stress and sleep disturbance. \par \par Location: generalized; occipital radiates to top of head\par described as pressure/ sharp  \par Associated symptoms: ?neck pain\par Pertinent negatives: jaw claudication, scalp sensitivity, photophobia, phonophobia, dizziness, anosmia,  blurred vision,visual aura, scotoma, memory impairment, neck stiffness, nausea\par Aggravated Factors: rapid head movements\par Relived by: self limiting or Tylenol \par Severity:  8\par Occurs;  ?3x/week or less \par Duration: few seconds- 1hr  \par Sleeps: fragmented since past 2-3 years. \par Hydration: water: 6bottles  caffeine: none\par Triggers: unknown\par \par had eyes examined  in June, normal  . \par \par currently sees neurology every three months  and rheumatology, does not take medications routinely \par \par meds tried in past\par propanol\par Topamax\par magnesium\par Ferciot \par Gabapentin\par  \par

## 2022-09-08 ENCOUNTER — APPOINTMENT (OUTPATIENT)
Dept: NEUROLOGY | Facility: CLINIC | Age: 57
End: 2022-09-08

## 2022-09-08 PROCEDURE — 93892 TCD EMBOLI DETECT W/O INJ: CPT

## 2022-09-08 PROCEDURE — 93886 INTRACRANIAL COMPLETE STUDY: CPT

## 2022-09-08 PROCEDURE — 93880 EXTRACRANIAL BILAT STUDY: CPT

## 2022-09-20 ENCOUNTER — APPOINTMENT (OUTPATIENT)
Dept: NEUROLOGY | Facility: CLINIC | Age: 57
End: 2022-09-20

## 2022-09-20 VITALS
HEIGHT: 68 IN | SYSTOLIC BLOOD PRESSURE: 143 MMHG | HEART RATE: 70 BPM | DIASTOLIC BLOOD PRESSURE: 88 MMHG | BODY MASS INDEX: 25.46 KG/M2 | WEIGHT: 168 LBS

## 2022-09-20 DIAGNOSIS — I67.1 CEREBRAL ANEURYSM, NONRUPTURED: ICD-10-CM

## 2022-09-20 PROCEDURE — 99215 OFFICE O/P EST HI 40 MIN: CPT

## 2022-09-20 RX ORDER — HYDROXYCHLOROQUINE SULFATE 200 MG/1
200 TABLET, FILM COATED ORAL
Refills: 0 | Status: ACTIVE | COMMUNITY

## 2022-09-20 NOTE — PHYSICAL EXAM

## 2022-09-20 NOTE — DISCUSSION/SUMMARY
[FreeTextEntry1] : Ms. Jiménez is a 57 year old woman HTN, neuromyelitis optica, possible Sjogren's syndrome, gastroparesis, headaches, stroke ( 2017) known incidental aneurysm referred by BARBIE Mora. We discussed the risks of having an aneurysm and the likelihood of causing a SAH. Based on the size and location of her aneurysm there is likely no risk for SAH and therefore I recommend conservative management with yearly MRAs. She will continue to follow with BARBIE Hicks for the headaches. I will see her again in 1 year. All of her questions and concerns were addressed.

## 2022-09-20 NOTE — HISTORY OF PRESENT ILLNESS
[FreeTextEntry1] : Ms. Jiménez is a 57 year old woman HTN, neuromyelitis optica, possible Sjogren's syndrome, gastroparesis, headaches, stroke ( 2017) and found to have an incidental aneurysm while in the hospital, referred by BARBIE Mora to discuss aneurysm. She has been followed by a neurologist at [facility] with yearly MRAs. MRA 08/22: 3 mm saccular aneurysm arising from the anterior cavernous segment of the left internal carotid artery. All neuroimaging reviewed personally by me.

## 2022-09-20 NOTE — CONSULT LETTER
[Dear  ___] : Dear ~TANNER, [Consult Letter:] : I had the pleasure of evaluating your patient, [unfilled]. [Consult Closing:] : Thank you very much for allowing me to participate in the care of this patient.  If you have any questions, please do not hesitate to contact me. [Please see my note below.] : Please see my note below. [Sincerely,] : Sincerely, [FreeTextEntry3] : Brant Donohue MD\par Chief, Vascular Neurology and Neurology Service , NeuroEndovascular Surgery\par  of Neurology and Radiology\par [facility] [employer] [facility]\par Director, Comprehensive Stroke Center and Stroke Unit\par [facility]\par Director, NeuroEndovascular Surgery\par [facility]\par

## 2022-09-20 NOTE — REVIEW OF SYSTEMS
[Fever] : no fever [Chills] : no chills [Feeling Poorly] : not feeling poorly [Feeling Tired] : not feeling tired [Confused or Disoriented] : no confusion [Memory Lapses or Loss] : no memory loss [Decr. Concentrating Ability] : no decrease in concentrating ability [Difficulty with Language] : no ~M difficulty with language [Changed Thought Patterns] : no change in thought patterns [Repeating Questions] : no repeated questioning about recent events [Facial Weakness] : no facial weakness [Arm Weakness] : no arm weakness [Hand Weakness] : no hand weakness [Poor Coordination] : good coordination [Difficulty Writing] : no difficulty writing [Difficulties in Speech] : no speech difficulties [Numbness] : no numbness [Tingling] : no tingling [Seizures] : no convulsions [Dizziness] : no dizziness [Fainting] : no fainting [Lightheadedness] : no lightheadedness [Vertigo] : no vertigo [Tension Headache] : no tension-type headache

## 2022-09-22 ENCOUNTER — APPOINTMENT (OUTPATIENT)
Dept: NEUROLOGY | Facility: CLINIC | Age: 57
End: 2022-09-22

## 2022-09-22 VITALS
HEIGHT: 68 IN | WEIGHT: 168 LBS | BODY MASS INDEX: 25.46 KG/M2 | SYSTOLIC BLOOD PRESSURE: 137 MMHG | HEART RATE: 60 BPM | DIASTOLIC BLOOD PRESSURE: 84 MMHG

## 2022-09-22 DIAGNOSIS — R51.9 HEADACHE, UNSPECIFIED: ICD-10-CM

## 2022-09-22 DIAGNOSIS — M54.2 CERVICALGIA: ICD-10-CM

## 2022-09-22 DIAGNOSIS — G89.29 HEADACHE, UNSPECIFIED: ICD-10-CM

## 2022-09-22 PROCEDURE — 99214 OFFICE O/P EST MOD 30 MIN: CPT

## 2022-09-22 NOTE — PHYSICAL EXAM
[Person] : oriented to person [Place] : oriented to place [Time] : oriented to time [Short Term Intact] : short term memory intact [Current Events] : adequate knowledge of current events [Cranial Nerves Optic (II)] : visual acuity intact bilaterally,  visual fields full to confrontation, pupils equal round and reactive to light [Cranial Nerves Oculomotor (III)] : extraocular motion intact [Cranial Nerves Trigeminal (V)] : facial sensation intact symmetrically [Cranial Nerves Facial (VII)] : face symmetrical [Cranial Nerves Vestibulocochlear (VIII)] : hearing was intact bilaterally [Cranial Nerves Glossopharyngeal (IX)] : tongue and palate midline [Cranial Nerves Accessory (XI - Cranial And Spinal)] : head turning and shoulder shrug symmetric [Cranial Nerves Hypoglossal (XII)] : there was no tongue deviation with protrusion [Motor Strength] : muscle strength was normal in all four extremities [Motor Handedness Right-Handed] : the patient is right hand dominant [Abnormal Walk] : normal gait [2+] : Ankle jerk left 2+ [General Appearance - In No Acute Distress] : in no acute distress [FreeTextEntry1] : left trap msk ttp, mild restrict rom

## 2022-09-22 NOTE — ASSESSMENT
[FreeTextEntry1] :  Chronic headaches patient with less intensity of head pains.with neck pain. s/p medrol derik. will obtain neck imaging. PT.   continue with vitamin therapy magnesium 400mg and vitamin b2 400mg. trial ubrevly, HA diary \par contact me if there are any changes in the quality or severity of the headaches.\par All questions answered, understanding verbalized.Patient in agreement with plan of care\par F/u televisit

## 2022-09-22 NOTE — HISTORY OF PRESENT ILLNESS
[FreeTextEntry1] : 57 year old f with Pmhx HTN, neuromyelitis optica, possible Sjogren's syndrome, gastroparesis, ?CVA in 2017  presents today for headaches \par \par  she was previously seen by Jenna Mora for MICHAEL and ? stroke that she was told to have had in 2017 at [facility]. (does not have documentation) she completed Brain MRI which reported normal and MRA with  3mm saccular aneurysm (known to have had this in 2017)  was to see Jayde Zarate which she has not nor has she seen  her vascular Dr at [facility].\par she reports she has been taking magnesium  and b2, but does not know the doses. has not had any changes in her headaches.\par \par 2012  dx with migraine headaches, was given  Fioricet which helped. had these for a couple of years, same location tried multiple meds in the past without bowen by her current neurologist at [facility].  refused nortriptyline as it is an antidepressant. \par past 3 years HA more frequent also stress and sleep disturbance. \par \par Location: generalized; occipital radiates to top of head\par described as pressure/ sharp  \par Associated symptoms: ?neck pain\par Pertinent negatives: jaw claudication, scalp sensitivity, photophobia, phonophobia, dizziness, anosmia,  blurred vision,visual aura, scotoma, memory impairment, neck stiffness, nausea\par Aggravated Factors: rapid head movements\par Relived by: self limiting or Tylenol \par Severity:  8\par Occurs;  ?3x/week or less \par Duration: few seconds- 1hr  \par Sleeps: fragmented since past 2-3 years. \par Hydration: water: 6 bottles  caffeine: none\par Triggers: unknown\par \par had eyes examined  in June, normal  . \par \par currently sees neurology every three months  and rheumatology, does not take medications routinely \par \par meds tried in past\par propanol\par Topamax\par magnesium\par Feriocet \par Gabapentin\par \par **Interval 9.22.22:since seen, she reports headaches have been less in intensity.  triggered with bending only and resolves.  ENT given medrol derik with no changes. \par can have  neck pain. \par \par  \par

## 2022-10-04 ENCOUNTER — OUTPATIENT (OUTPATIENT)
Dept: OUTPATIENT SERVICES | Facility: HOSPITAL | Age: 57
LOS: 1 days | End: 2022-10-04
Payer: COMMERCIAL

## 2022-10-04 ENCOUNTER — APPOINTMENT (OUTPATIENT)
Dept: MRI IMAGING | Facility: CLINIC | Age: 57
End: 2022-10-04

## 2022-10-04 DIAGNOSIS — M54.2 CERVICALGIA: ICD-10-CM

## 2022-10-04 DIAGNOSIS — R51.9 HEADACHE, UNSPECIFIED: ICD-10-CM

## 2022-10-04 DIAGNOSIS — Z00.8 ENCOUNTER FOR OTHER GENERAL EXAMINATION: ICD-10-CM

## 2022-10-04 DIAGNOSIS — Z90.710 ACQUIRED ABSENCE OF BOTH CERVIX AND UTERUS: Chronic | ICD-10-CM

## 2022-10-04 PROCEDURE — 72141 MRI NECK SPINE W/O DYE: CPT | Mod: 26

## 2022-10-04 PROCEDURE — 72141 MRI NECK SPINE W/O DYE: CPT

## 2022-11-18 ENCOUNTER — APPOINTMENT (OUTPATIENT)
Dept: OTOLARYNGOLOGY | Facility: CLINIC | Age: 57
End: 2022-11-18

## 2023-02-23 ENCOUNTER — RX RENEWAL (OUTPATIENT)
Age: 58
End: 2023-02-23

## 2023-09-26 ENCOUNTER — APPOINTMENT (OUTPATIENT)
Dept: NEUROLOGY | Facility: CLINIC | Age: 58
End: 2023-09-26

## 2024-01-03 ENCOUNTER — RX RENEWAL (OUTPATIENT)
Age: 59
End: 2024-01-03

## 2024-01-03 RX ORDER — UBROGEPANT 100 MG/1
100 TABLET ORAL
Qty: 10 | Refills: 1 | Status: ACTIVE | COMMUNITY
Start: 2022-08-19 | End: 1900-01-01

## 2025-07-24 ENCOUNTER — APPOINTMENT (OUTPATIENT)
Dept: NEUROLOGY | Facility: CLINIC | Age: 60
End: 2025-07-24
Payer: COMMERCIAL

## 2025-07-24 VITALS
OXYGEN SATURATION: 99 % | SYSTOLIC BLOOD PRESSURE: 130 MMHG | DIASTOLIC BLOOD PRESSURE: 80 MMHG | RESPIRATION RATE: 22 BRPM | HEIGHT: 68 IN | BODY MASS INDEX: 25.31 KG/M2 | WEIGHT: 167 LBS | HEART RATE: 78 BPM

## 2025-07-24 DIAGNOSIS — G43.909 MIGRAINE, UNSPECIFIED, NOT INTRACTABLE, W/OUT STATUS MIGRAINOSUS: ICD-10-CM

## 2025-07-24 DIAGNOSIS — I67.1 CEREBRAL ANEURYSM, NONRUPTURED: ICD-10-CM

## 2025-07-24 PROCEDURE — 99214 OFFICE O/P EST MOD 30 MIN: CPT

## 2025-07-24 PROCEDURE — G2211 COMPLEX E/M VISIT ADD ON: CPT | Mod: NC

## 2025-08-27 ENCOUNTER — NON-APPOINTMENT (OUTPATIENT)
Age: 60
End: 2025-08-27

## 2025-09-05 ENCOUNTER — NON-APPOINTMENT (OUTPATIENT)
Age: 60
End: 2025-09-05